# Patient Record
Sex: MALE | Race: BLACK OR AFRICAN AMERICAN | Employment: OTHER | URBAN - METROPOLITAN AREA
[De-identification: names, ages, dates, MRNs, and addresses within clinical notes are randomized per-mention and may not be internally consistent; named-entity substitution may affect disease eponyms.]

---

## 2017-05-10 ENCOUNTER — OFFICE VISIT (OUTPATIENT)
Dept: INFECTIOUS DISEASES | Age: 72
End: 2017-05-10

## 2017-05-10 VITALS
SYSTOLIC BLOOD PRESSURE: 122 MMHG | BODY MASS INDEX: 24.78 KG/M2 | DIASTOLIC BLOOD PRESSURE: 66 MMHG | WEIGHT: 163 LBS | TEMPERATURE: 98.6 F

## 2017-05-10 DIAGNOSIS — I10 ESSENTIAL HYPERTENSION: ICD-10-CM

## 2017-05-10 DIAGNOSIS — Z23 NEED FOR PNEUMOCOCCAL VACCINATION: ICD-10-CM

## 2017-05-10 DIAGNOSIS — B20 HIV (HUMAN IMMUNODEFICIENCY VIRUS INFECTION) (HCC): ICD-10-CM

## 2017-05-10 DIAGNOSIS — B20 HIV (HUMAN IMMUNODEFICIENCY VIRUS INFECTION) (HCC): Primary | ICD-10-CM

## 2017-05-10 DIAGNOSIS — F20.89 OTHER SCHIZOPHRENIA (HCC): ICD-10-CM

## 2017-05-10 LAB
A/G RATIO: 1.4 (ref 1.1–2.2)
ALBUMIN SERPL-MCNC: 4.2 G/DL (ref 3.4–5)
ALP BLD-CCNC: 65 U/L (ref 40–129)
ALT SERPL-CCNC: 10 U/L (ref 10–40)
ANION GAP SERPL CALCULATED.3IONS-SCNC: 18 MMOL/L (ref 3–16)
AST SERPL-CCNC: 12 U/L (ref 15–37)
BASOPHILS ABSOLUTE: 0.1 K/UL (ref 0–0.2)
BASOPHILS RELATIVE PERCENT: 0.9 %
BILIRUB SERPL-MCNC: 2.5 MG/DL (ref 0–1)
BUN BLDV-MCNC: 12 MG/DL (ref 7–20)
CALCIUM SERPL-MCNC: 9.4 MG/DL (ref 8.3–10.6)
CHLORIDE BLD-SCNC: 99 MMOL/L (ref 99–110)
CO2: 25 MMOL/L (ref 21–32)
CREAT SERPL-MCNC: 0.7 MG/DL (ref 0.8–1.3)
EOSINOPHILS ABSOLUTE: 0.4 K/UL (ref 0–0.6)
EOSINOPHILS RELATIVE PERCENT: 5.4 %
GFR AFRICAN AMERICAN: >60
GFR NON-AFRICAN AMERICAN: >60
GLOBULIN: 3.1 G/DL
GLUCOSE BLD-MCNC: 85 MG/DL (ref 70–99)
HCT VFR BLD CALC: 42 % (ref 40.5–52.5)
HEMOGLOBIN: 14.2 G/DL (ref 13.5–17.5)
LYMPHOCYTES ABSOLUTE: 2.5 K/UL (ref 1–5.1)
LYMPHOCYTES RELATIVE PERCENT: 33.4 %
MCH RBC QN AUTO: 33.5 PG (ref 26–34)
MCHC RBC AUTO-ENTMCNC: 33.8 G/DL (ref 31–36)
MCV RBC AUTO: 98.9 FL (ref 80–100)
MONOCYTES ABSOLUTE: 0.7 K/UL (ref 0–1.3)
MONOCYTES RELATIVE PERCENT: 8.9 %
NEUTROPHILS ABSOLUTE: 3.8 K/UL (ref 1.7–7.7)
NEUTROPHILS RELATIVE PERCENT: 51.4 %
PDW BLD-RTO: 13.6 % (ref 12.4–15.4)
PLATELET # BLD: 224 K/UL (ref 135–450)
PMV BLD AUTO: 8.8 FL (ref 5–10.5)
POTASSIUM SERPL-SCNC: 4 MMOL/L (ref 3.5–5.1)
RBC # BLD: 4.25 M/UL (ref 4.2–5.9)
SODIUM BLD-SCNC: 142 MMOL/L (ref 136–145)
TOTAL PROTEIN: 7.3 G/DL (ref 6.4–8.2)
WBC # BLD: 7.5 K/UL (ref 4–11)

## 2017-05-10 PROCEDURE — 90732 PPSV23 VACC 2 YRS+ SUBQ/IM: CPT | Performed by: INTERNAL MEDICINE

## 2017-05-10 PROCEDURE — G0009 ADMIN PNEUMOCOCCAL VACCINE: HCPCS | Performed by: INTERNAL MEDICINE

## 2017-05-10 PROCEDURE — 99215 OFFICE O/P EST HI 40 MIN: CPT | Performed by: INTERNAL MEDICINE

## 2017-05-11 LAB — RPR: NORMAL

## 2017-05-13 LAB
HIV RNA PCR INTERPRETATION: DETECTED
HIV-1 RNA BY PCR, QN: ABNORMAL CPY/ML
HIV-1 RNA BY PCR, QN: ABNORMAL LOG
QUANTIFERON (R) TB GOLD (INCUBATED): NEGATIVE
QUANTIFERON MITOGEN: 9.68 IU/ML
QUANTIFERON NIL: 0.13 IU/ML
QUANTIFERON TB AG MINUS NIL: 0.2 IU/ML (ref 0–0.34)

## 2017-05-18 ENCOUNTER — CLINICAL DOCUMENTATION (OUTPATIENT)
Dept: INFECTIOUS DISEASES | Age: 72
End: 2017-05-18

## 2018-08-06 ENCOUNTER — OFFICE VISIT (OUTPATIENT)
Dept: INFECTIOUS DISEASES | Age: 73
End: 2018-08-06

## 2018-08-06 VITALS
HEIGHT: 68 IN | BODY MASS INDEX: 26.22 KG/M2 | SYSTOLIC BLOOD PRESSURE: 112 MMHG | TEMPERATURE: 98.5 F | WEIGHT: 173 LBS | DIASTOLIC BLOOD PRESSURE: 66 MMHG

## 2018-08-06 DIAGNOSIS — B20 HIV (HUMAN IMMUNODEFICIENCY VIRUS INFECTION) (HCC): ICD-10-CM

## 2018-08-06 DIAGNOSIS — I10 ESSENTIAL HYPERTENSION: ICD-10-CM

## 2018-08-06 DIAGNOSIS — B20 HIV (HUMAN IMMUNODEFICIENCY VIRUS INFECTION) (HCC): Primary | ICD-10-CM

## 2018-08-06 DIAGNOSIS — Z23 NEED FOR TDAP VACCINATION: ICD-10-CM

## 2018-08-06 DIAGNOSIS — F20.89 OTHER SCHIZOPHRENIA (HCC): ICD-10-CM

## 2018-08-06 LAB
A/G RATIO: 1.4 (ref 1.1–2.2)
ALBUMIN SERPL-MCNC: 4.4 G/DL (ref 3.4–5)
ALP BLD-CCNC: 76 U/L (ref 40–129)
ALT SERPL-CCNC: 14 U/L (ref 10–40)
ANION GAP SERPL CALCULATED.3IONS-SCNC: 16 MMOL/L (ref 3–16)
AST SERPL-CCNC: 15 U/L (ref 15–37)
BASOPHILS ABSOLUTE: 0 K/UL (ref 0–0.2)
BASOPHILS RELATIVE PERCENT: 0.5 %
BILIRUB SERPL-MCNC: 0.6 MG/DL (ref 0–1)
BUN BLDV-MCNC: 13 MG/DL (ref 7–20)
CALCIUM SERPL-MCNC: 9.8 MG/DL (ref 8.3–10.6)
CHLORIDE BLD-SCNC: 105 MMOL/L (ref 99–110)
CO2: 26 MMOL/L (ref 21–32)
CREAT SERPL-MCNC: 0.8 MG/DL (ref 0.8–1.3)
EOSINOPHILS ABSOLUTE: 0.4 K/UL (ref 0–0.6)
EOSINOPHILS RELATIVE PERCENT: 4.5 %
GFR AFRICAN AMERICAN: >60
GFR NON-AFRICAN AMERICAN: >60
GLOBULIN: 3.2 G/DL
GLUCOSE BLD-MCNC: 91 MG/DL (ref 70–99)
HCT VFR BLD CALC: 44.7 % (ref 40.5–52.5)
HEMOGLOBIN: 15.3 G/DL (ref 13.5–17.5)
LYMPHOCYTES ABSOLUTE: 2.5 K/UL (ref 1–5.1)
LYMPHOCYTES RELATIVE PERCENT: 27.8 %
MCH RBC QN AUTO: 34 PG (ref 26–34)
MCHC RBC AUTO-ENTMCNC: 34.1 G/DL (ref 31–36)
MCV RBC AUTO: 99.5 FL (ref 80–100)
MONOCYTES ABSOLUTE: 0.8 K/UL (ref 0–1.3)
MONOCYTES RELATIVE PERCENT: 9.3 %
NEUTROPHILS ABSOLUTE: 5.2 K/UL (ref 1.7–7.7)
NEUTROPHILS RELATIVE PERCENT: 57.9 %
PDW BLD-RTO: 12.9 % (ref 12.4–15.4)
PLATELET # BLD: 268 K/UL (ref 135–450)
PMV BLD AUTO: 8.1 FL (ref 5–10.5)
POTASSIUM SERPL-SCNC: 4.2 MMOL/L (ref 3.5–5.1)
RBC # BLD: 4.49 M/UL (ref 4.2–5.9)
SODIUM BLD-SCNC: 147 MMOL/L (ref 136–145)
TOTAL PROTEIN: 7.6 G/DL (ref 6.4–8.2)
WBC # BLD: 8.9 K/UL (ref 4–11)

## 2018-08-06 PROCEDURE — 99215 OFFICE O/P EST HI 40 MIN: CPT | Performed by: INTERNAL MEDICINE

## 2018-08-06 PROCEDURE — 90715 TDAP VACCINE 7 YRS/> IM: CPT | Performed by: INTERNAL MEDICINE

## 2018-08-06 PROCEDURE — 90471 IMMUNIZATION ADMIN: CPT | Performed by: INTERNAL MEDICINE

## 2018-08-06 NOTE — PROGRESS NOTES
Infectious Diseases HIV Outpatient Note      HIV history:   Test + July 1998, prior care at [facility] - Dr Geraldo Hurley, prior meds - combovir + atazavir / r ,changed to epzicom + evotaz on 7/2015. Primary Care Physician:  Atiya Pagan MD  Initial visit    6/15/15  History Obtained From:   Patient    CHIEF COMPLAINT:    Chief Complaint   Patient presents with    Follow-up     HIV       HISTORY OF PRESENT ILLNESS / Interval history:      Seen 6/15/15 - initial visit, see HIV hx, PMH, ROS, PE sections. Seen 7/20/15, 11/18/15. Pt had been at [facility] x 6 weeks, discharge on 4/15 to [place] [address]. Seen 5/4/16:  Taking and tolerating medications (Epzicom and Evotaz). Pt reports he is feeling good today. No complaints. Seen 11/9/16:    Pt last seen in May. Has had multiple psychiatric admission since last visit. Now living at [place] [address]. He is not taking HIV or other medication. He does not trust medication. He wants to leave NH. Now, he has a guardian. Seen 5/10/17:  Pt still living at [place] [address]. He IS taking HIV meds (?dolutegravir / descovy - pt did not come with a med list). He has no complaint today    Today 8/6/18:  Pt still living at [place] [address].   He is taking and tolerating taking HIV meds (abacavir / lamuvidine / Shyanne Fergusson per [facility])  He has no complaint today       Past Medical History:   Diagnosis Date    Depression     Diabetes mellitus ([address]     Erectile dysfunction     High triglycerides     HIV (human immunodeficiency virus infection) ([address]     Hyperlipidemia     Hypertension     Other specified personal history presenting hazards to health(V15.89)     5/08 1/09 psychiatric hospitalizations    Schizophrenia ([address]     Seizures ([address]     Vitamin D deficiency    vit D deficiency      Past Surgical History:   Procedure Laterality Date    HEMORRHOID SURGERY         Current Medications:    Current Outpatient sensory change or other neurologic symptom  No lymph node swelling or tenderness. No symptoms endocrinopathy. No symptoms hematologic disease. PHYSICAL EXAM:    Vitals:    /66   Temp 98.5 °F (36.9 °C) (Oral)   Ht 5' 8\" (1.727 m)   Wt 173 lb (78.5 kg)   BMI 26.30 kg/m²     GENERAL: No apparent distress. HEENT: Membranes moist, no conjunctival changes, no oral lesion - dentures  NECK:  Supple, no masses  LYMPH: No adenopathy   LUNGS: Clear b/l, no rales, no dullness  CARDIAC: RRR, no murmur appreciated  ABD:  + BS, soft / NT, no masses  :  No inguinal adenopathy, testis descended without swelling nor masses, epididymis normal, penis without lesions nor ulceration, meatus normal; no external anal lesions nor ulcers. EXT:  No rash, no edema, no lesions  NEURO: No focal neurologic findings  PSYCH: Orientation, sensorium, mood normal    DATA:    See EPIC -   6/15/15 CD4 746 (27%), VL <20  11/18/15 CD4 782 (26) , VL <20  5/4/16   CD4 618, VL <20  11/9/16 CD4 424, VL 41,000 [off medications]    IMPRESSION    # HIV: since 1998, prior care at [place] [address]. On atazanavir / r / combovir; prefer to get pt off combovir (twice a day, tenofovir not optimal for older pt with HTN, will changed to epzicom with abacavir and lamuvidine (once day too)) in 7/2015. # HTN:   # Psych: schizophrenia, depression; sees Dr Kathi Saavedra [facility]); on quetiapine (seroquel); recent hospitalization at [facility] [place].   Past psychiatrist - Dr Graham Nagy  # Gout: on allopurinol    HIV Health Maintenance:  HIV:  Diagnosis / RF 3/1998 / MSM   HIV genotype     HLA   6/15/15 negative    CD4    5/10/17 - 602 (22%)   HIV VL   5/10/17 <20   Therapy  8/6/18 - start doltegravir / descovy; prior evotaz / abacavir / lamuvidine    Vaccines:   Pneumovax   5/10/17    Prevnar  11/18/15   Meningococcal vax     Flu vax   11/18/15   HAV vax  11/18/15,5/4/16; 11/9/16 HAV ab +   HBV vax   6/15/15 HB Sab +; immune    Tdap

## 2018-08-09 LAB
HIV RNA PCR INTERPRETATION: DETECTED
HIV-1 RNA BY PCR, QN: 1.3 LOG
HIV-1 RNA BY PCR, QN: 22 CPY/ML

## 2018-08-10 LAB — MISCELLANEOUS LAB TEST ORDER: NORMAL

## 2019-01-07 ENCOUNTER — OFFICE VISIT (OUTPATIENT)
Dept: INFECTIOUS DISEASES | Age: 74
End: 2019-01-07
Payer: COMMERCIAL

## 2019-01-07 VITALS
SYSTOLIC BLOOD PRESSURE: 108 MMHG | BODY MASS INDEX: 25.54 KG/M2 | TEMPERATURE: 98 F | WEIGHT: 168 LBS | DIASTOLIC BLOOD PRESSURE: 60 MMHG

## 2019-01-07 DIAGNOSIS — B20 HIV (HUMAN IMMUNODEFICIENCY VIRUS INFECTION) (HCC): ICD-10-CM

## 2019-01-07 DIAGNOSIS — F20.89 OTHER SCHIZOPHRENIA (HCC): ICD-10-CM

## 2019-01-07 DIAGNOSIS — B20 HIV (HUMAN IMMUNODEFICIENCY VIRUS INFECTION) (HCC): Primary | ICD-10-CM

## 2019-01-07 DIAGNOSIS — Z23 NEED FOR MENINGOCOCCUS VACCINE: Primary | ICD-10-CM

## 2019-01-07 DIAGNOSIS — I10 ESSENTIAL HYPERTENSION: ICD-10-CM

## 2019-01-07 LAB
A/G RATIO: 1.4 (ref 1.1–2.2)
ALBUMIN SERPL-MCNC: 4.5 G/DL (ref 3.4–5)
ALP BLD-CCNC: 71 U/L (ref 40–129)
ALT SERPL-CCNC: 14 U/L (ref 10–40)
ANION GAP SERPL CALCULATED.3IONS-SCNC: 19 MMOL/L (ref 3–16)
AST SERPL-CCNC: 17 U/L (ref 15–37)
BASOPHILS ABSOLUTE: 0 K/UL (ref 0–0.2)
BASOPHILS RELATIVE PERCENT: 0.3 %
BILIRUB SERPL-MCNC: 0.6 MG/DL (ref 0–1)
BUN BLDV-MCNC: 15 MG/DL (ref 7–20)
CALCIUM SERPL-MCNC: 9.8 MG/DL (ref 8.3–10.6)
CHLORIDE BLD-SCNC: 102 MMOL/L (ref 99–110)
CO2: 23 MMOL/L (ref 21–32)
CREAT SERPL-MCNC: 1 MG/DL (ref 0.8–1.3)
EOSINOPHILS ABSOLUTE: 0.3 K/UL (ref 0–0.6)
EOSINOPHILS RELATIVE PERCENT: 2.6 %
GFR AFRICAN AMERICAN: >60
GFR NON-AFRICAN AMERICAN: >60
GLOBULIN: 3.3 G/DL
GLUCOSE BLD-MCNC: 79 MG/DL (ref 70–99)
HCT VFR BLD CALC: 47.9 % (ref 40.5–52.5)
HEMOGLOBIN: 16.1 G/DL (ref 13.5–17.5)
LYMPHOCYTES ABSOLUTE: 2.3 K/UL (ref 1–5.1)
LYMPHOCYTES RELATIVE PERCENT: 22.8 %
MCH RBC QN AUTO: 33.1 PG (ref 26–34)
MCHC RBC AUTO-ENTMCNC: 33.5 G/DL (ref 31–36)
MCV RBC AUTO: 98.7 FL (ref 80–100)
MONOCYTES ABSOLUTE: 0.9 K/UL (ref 0–1.3)
MONOCYTES RELATIVE PERCENT: 8.7 %
NEUTROPHILS ABSOLUTE: 6.6 K/UL (ref 1.7–7.7)
NEUTROPHILS RELATIVE PERCENT: 65.6 %
PDW BLD-RTO: 12.8 % (ref 12.4–15.4)
PLATELET # BLD: 256 K/UL (ref 135–450)
PMV BLD AUTO: 8.4 FL (ref 5–10.5)
POTASSIUM SERPL-SCNC: 4 MMOL/L (ref 3.5–5.1)
RBC # BLD: 4.85 M/UL (ref 4.2–5.9)
SODIUM BLD-SCNC: 144 MMOL/L (ref 136–145)
TOTAL PROTEIN: 7.8 G/DL (ref 6.4–8.2)
WBC # BLD: 10 K/UL (ref 4–11)

## 2019-01-07 PROCEDURE — 90471 IMMUNIZATION ADMIN: CPT | Performed by: INTERNAL MEDICINE

## 2019-01-07 PROCEDURE — 99215 OFFICE O/P EST HI 40 MIN: CPT | Performed by: INTERNAL MEDICINE

## 2019-01-07 PROCEDURE — 90734 MENACWYD/MENACWYCRM VACC IM: CPT | Performed by: INTERNAL MEDICINE

## 2019-01-10 LAB
HIV-1 QNT LOG, IU/ML: <1.47 LOG CPY/ML
HIV-1 QNT, IU/ML: ABNORMAL CPY/ML
INTERPRETATION: DETECTED

## 2019-10-01 RX ORDER — CETIRIZINE HYDROCHLORIDE 10 MG/1
10 TABLET ORAL DAILY
COMMUNITY

## 2019-10-01 RX ORDER — LACOSAMIDE 50 MG/1
50 TABLET ORAL 2 TIMES DAILY
COMMUNITY

## 2019-10-01 RX ORDER — HYDROCHLOROTHIAZIDE 12.5 MG/1
12.5 CAPSULE, GELATIN COATED ORAL DAILY
COMMUNITY

## 2019-10-01 RX ORDER — FLUPHENAZINE HYDROCHLORIDE 10 MG/1
10 TABLET ORAL DAILY
COMMUNITY

## 2019-10-01 RX ORDER — ERYTHROMYCIN 5 MG/G
OINTMENT OPHTHALMIC NIGHTLY
COMMUNITY

## 2019-10-04 ENCOUNTER — ANESTHESIA EVENT (OUTPATIENT)
Dept: SURGERY | Age: 74
End: 2019-10-04
Payer: MEDICARE

## 2019-10-07 ENCOUNTER — ANESTHESIA (OUTPATIENT)
Dept: SURGERY | Age: 74
End: 2019-10-07
Payer: MEDICARE

## 2019-10-07 ENCOUNTER — HOSPITAL ENCOUNTER (OUTPATIENT)
Age: 74
Setting detail: OUTPATIENT SURGERY
Discharge: HOME | End: 2019-10-07
Attending: OPHTHALMOLOGY | Admitting: OPHTHALMOLOGY
Payer: MEDICARE

## 2019-10-07 VITALS
TEMPERATURE: 97.9 F | DIASTOLIC BLOOD PRESSURE: 72 MMHG | HEART RATE: 73 BPM | SYSTOLIC BLOOD PRESSURE: 116 MMHG | BODY MASS INDEX: 26.07 KG/M2 | OXYGEN SATURATION: 100 % | WEIGHT: 172 LBS | HEIGHT: 68 IN | RESPIRATION RATE: 15 BRPM

## 2019-10-07 VITALS — OXYGEN SATURATION: 100 % | DIASTOLIC BLOOD PRESSURE: 72 MMHG | SYSTOLIC BLOOD PRESSURE: 101 MMHG

## 2019-10-07 PROBLEM — H02.036: Status: ACTIVE | Noted: 2019-10-07

## 2019-10-07 PROBLEM — H02.036: Status: RESOLVED | Noted: 2019-10-07 | Resolved: 2019-10-07

## 2019-10-07 PROCEDURE — 6370000000 HC RX 637 (ALT 250 FOR IP): Performed by: OPHTHALMOLOGY

## 2019-10-07 PROCEDURE — 2709999900 HC NON-CHARGEABLE SUPPLY: Performed by: OPHTHALMOLOGY

## 2019-10-07 PROCEDURE — 3700000000 HC ANESTHESIA ATTENDED CARE: Performed by: OPHTHALMOLOGY

## 2019-10-07 PROCEDURE — 2580000003 HC RX 258: Performed by: NURSE ANESTHETIST, CERTIFIED REGISTERED

## 2019-10-07 PROCEDURE — 2500000003 HC RX 250 WO HCPCS: Performed by: OPHTHALMOLOGY

## 2019-10-07 PROCEDURE — 3600000012 HC SURGERY LEVEL 2 ADDTL 15MIN: Performed by: OPHTHALMOLOGY

## 2019-10-07 PROCEDURE — 3700000001 HC ADD 15 MINUTES (ANESTHESIA): Performed by: OPHTHALMOLOGY

## 2019-10-07 PROCEDURE — 7100000010 HC PHASE II RECOVERY - FIRST 15 MIN: Performed by: OPHTHALMOLOGY

## 2019-10-07 PROCEDURE — 7100000011 HC PHASE II RECOVERY - ADDTL 15 MIN: Performed by: OPHTHALMOLOGY

## 2019-10-07 PROCEDURE — 3600000002 HC SURGERY LEVEL 2 BASE: Performed by: OPHTHALMOLOGY

## 2019-10-07 PROCEDURE — 6360000002 HC RX W HCPCS: Performed by: NURSE ANESTHETIST, CERTIFIED REGISTERED

## 2019-10-07 RX ORDER — SODIUM CHLORIDE 9 MG/ML
INJECTION, SOLUTION INTRAVENOUS CONTINUOUS
Status: DISCONTINUED | OUTPATIENT
Start: 2019-10-07 | End: 2019-10-07 | Stop reason: HOSPADM

## 2019-10-07 RX ORDER — PROPOFOL 10 MG/ML
INJECTION, EMULSION INTRAVENOUS PRN
Status: DISCONTINUED | OUTPATIENT
Start: 2019-10-07 | End: 2019-10-07 | Stop reason: SDUPTHER

## 2019-10-07 RX ORDER — ERYTHROMYCIN 5 MG/G
OINTMENT OPHTHALMIC
Status: COMPLETED | OUTPATIENT
Start: 2019-10-07 | End: 2019-10-07

## 2019-10-07 RX ORDER — TETRACAINE HYDROCHLORIDE 5 MG/ML
SOLUTION OPHTHALMIC
Status: COMPLETED | OUTPATIENT
Start: 2019-10-07 | End: 2019-10-07

## 2019-10-07 RX ORDER — MIDAZOLAM HYDROCHLORIDE 1 MG/ML
INJECTION INTRAMUSCULAR; INTRAVENOUS PRN
Status: DISCONTINUED | OUTPATIENT
Start: 2019-10-07 | End: 2019-10-07 | Stop reason: SDUPTHER

## 2019-10-07 RX ORDER — ONDANSETRON 2 MG/ML
4 INJECTION INTRAMUSCULAR; INTRAVENOUS
Status: DISCONTINUED | OUTPATIENT
Start: 2019-10-07 | End: 2019-10-07 | Stop reason: HOSPADM

## 2019-10-07 RX ORDER — SODIUM CHLORIDE 0.9 % (FLUSH) 0.9 %
10 SYRINGE (ML) INJECTION PRN
Status: DISCONTINUED | OUTPATIENT
Start: 2019-10-07 | End: 2019-10-07 | Stop reason: HOSPADM

## 2019-10-07 RX ORDER — SODIUM CHLORIDE 9 MG/ML
INJECTION, SOLUTION INTRAVENOUS CONTINUOUS PRN
Status: DISCONTINUED | OUTPATIENT
Start: 2019-10-07 | End: 2019-10-07 | Stop reason: SDUPTHER

## 2019-10-07 RX ORDER — SODIUM CHLORIDE 0.9 % (FLUSH) 0.9 %
10 SYRINGE (ML) INJECTION EVERY 12 HOURS SCHEDULED
Status: DISCONTINUED | OUTPATIENT
Start: 2019-10-07 | End: 2019-10-07 | Stop reason: HOSPADM

## 2019-10-07 RX ADMIN — PROPOFOL 70 MG: 10 INJECTION, EMULSION INTRAVENOUS at 10:40

## 2019-10-07 RX ADMIN — SODIUM CHLORIDE: 9 INJECTION, SOLUTION INTRAVENOUS at 10:27

## 2019-10-07 RX ADMIN — MIDAZOLAM 1 MG: 1 INJECTION INTRAMUSCULAR; INTRAVENOUS at 10:40

## 2019-10-07 ASSESSMENT — PULMONARY FUNCTION TESTS
PIF_VALUE: 0

## 2019-10-07 ASSESSMENT — PAIN SCALES - GENERAL
PAINLEVEL_OUTOF10: 0
PAINLEVEL_OUTOF10: 0

## 2019-10-07 ASSESSMENT — PAIN - FUNCTIONAL ASSESSMENT: PAIN_FUNCTIONAL_ASSESSMENT: 0-10

## 2019-10-07 ASSESSMENT — LIFESTYLE VARIABLES: SMOKING_STATUS: 1

## 2019-10-07 ASSESSMENT — ENCOUNTER SYMPTOMS: SHORTNESS OF BREATH: 0

## 2022-12-29 ENCOUNTER — HOSPITAL ENCOUNTER (EMERGENCY)
Age: 77
Discharge: OTHER INSTITUTION | End: 2022-12-29
Attending: EMERGENCY MEDICINE
Payer: MEDICARE

## 2022-12-29 VITALS
RESPIRATION RATE: 16 BRPM | SYSTOLIC BLOOD PRESSURE: 135 MMHG | DIASTOLIC BLOOD PRESSURE: 84 MMHG | HEART RATE: 80 BPM | TEMPERATURE: 97.9 F | OXYGEN SATURATION: 97 %

## 2022-12-29 DIAGNOSIS — Z13.9 ENCOUNTER FOR MEDICAL SCREENING EXAMINATION: Primary | ICD-10-CM

## 2022-12-29 LAB
ALBUMIN SERPL-MCNC: 3.9 G/DL (ref 3.4–5)
ALP BLD-CCNC: 43 U/L (ref 40–129)
ALT SERPL-CCNC: 7 U/L (ref 10–40)
ANION GAP SERPL CALCULATED.3IONS-SCNC: 10 MMOL/L (ref 3–16)
AST SERPL-CCNC: 17 U/L (ref 15–37)
BASOPHILS ABSOLUTE: 0 K/UL (ref 0–0.2)
BASOPHILS RELATIVE PERCENT: 0.7 %
BILIRUB SERPL-MCNC: 0.4 MG/DL (ref 0–1)
BILIRUBIN DIRECT: <0.2 MG/DL (ref 0–0.3)
BILIRUBIN URINE: NEGATIVE
BILIRUBIN, INDIRECT: ABNORMAL MG/DL (ref 0–1)
BLOOD, URINE: ABNORMAL
BUN BLDV-MCNC: 11 MG/DL (ref 7–20)
CALCIUM SERPL-MCNC: 9.2 MG/DL (ref 8.3–10.6)
CHLORIDE BLD-SCNC: 104 MMOL/L (ref 99–110)
CLARITY: CLEAR
CO2: 26 MMOL/L (ref 21–32)
COLOR: YELLOW
CREAT SERPL-MCNC: 0.9 MG/DL (ref 0.8–1.3)
EOSINOPHILS ABSOLUTE: 0.2 K/UL (ref 0–0.6)
EOSINOPHILS RELATIVE PERCENT: 3.3 %
GFR SERPL CREATININE-BSD FRML MDRD: >60 ML/MIN/{1.73_M2}
GLUCOSE BLD-MCNC: 117 MG/DL (ref 70–99)
GLUCOSE URINE: NEGATIVE MG/DL
HCT VFR BLD CALC: 40.5 % (ref 40.5–52.5)
HEMOGLOBIN: 13.9 G/DL (ref 13.5–17.5)
KETONES, URINE: NEGATIVE MG/DL
LEUKOCYTE ESTERASE, URINE: NEGATIVE
LYMPHOCYTES ABSOLUTE: 2 K/UL (ref 1–5.1)
LYMPHOCYTES RELATIVE PERCENT: 31.1 %
MCH RBC QN AUTO: 31.7 PG (ref 26–34)
MCHC RBC AUTO-ENTMCNC: 34.3 G/DL (ref 31–36)
MCV RBC AUTO: 92.7 FL (ref 80–100)
MICROSCOPIC EXAMINATION: YES
MONOCYTES ABSOLUTE: 0.5 K/UL (ref 0–1.3)
MONOCYTES RELATIVE PERCENT: 7.7 %
MUCUS: ABNORMAL /LPF
NEUTROPHILS ABSOLUTE: 3.6 K/UL (ref 1.7–7.7)
NEUTROPHILS RELATIVE PERCENT: 57.2 %
NITRITE, URINE: NEGATIVE
PDW BLD-RTO: 13.2 % (ref 12.4–15.4)
PH UA: 6 (ref 5–8)
PLATELET # BLD: 216 K/UL (ref 135–450)
PMV BLD AUTO: 7.6 FL (ref 5–10.5)
POTASSIUM REFLEX MAGNESIUM: 4.1 MMOL/L (ref 3.5–5.1)
PROTEIN UA: 30 MG/DL
RBC # BLD: 4.37 M/UL (ref 4.2–5.9)
RBC UA: ABNORMAL /HPF (ref 0–4)
SODIUM BLD-SCNC: 140 MMOL/L (ref 136–145)
SPECIFIC GRAVITY UA: 1.02 (ref 1–1.03)
TOTAL PROTEIN: 7.5 G/DL (ref 6.4–8.2)
URINE REFLEX TO CULTURE: ABNORMAL
URINE TYPE: ABNORMAL
UROBILINOGEN, URINE: 0.2 E.U./DL
WBC # BLD: 6.3 K/UL (ref 4–11)
WBC UA: ABNORMAL /HPF (ref 0–5)

## 2022-12-29 PROCEDURE — 80048 BASIC METABOLIC PNL TOTAL CA: CPT

## 2022-12-29 PROCEDURE — 99283 EMERGENCY DEPT VISIT LOW MDM: CPT

## 2022-12-29 PROCEDURE — 85025 COMPLETE CBC W/AUTO DIFF WBC: CPT

## 2022-12-29 PROCEDURE — 80076 HEPATIC FUNCTION PANEL: CPT

## 2022-12-29 PROCEDURE — 81001 URINALYSIS AUTO W/SCOPE: CPT

## 2022-12-29 PROCEDURE — 36415 COLL VENOUS BLD VENIPUNCTURE: CPT

## 2022-12-29 ASSESSMENT — ENCOUNTER SYMPTOMS
SHORTNESS OF BREATH: 0
BACK PAIN: 0
CHEST TIGHTNESS: 0
ABDOMINAL PAIN: 0
NAUSEA: 0
VOMITING: 0

## 2022-12-29 ASSESSMENT — PAIN - FUNCTIONAL ASSESSMENT: PAIN_FUNCTIONAL_ASSESSMENT: NONE - DENIES PAIN

## 2022-12-29 NOTE — ED PROVIDER NOTES
[address] ENCOUNTER          PHYSICIAN ASSISTANT NOTE       Date of evaluation: 12/29/2022    Chief Complaint     Other (Sent from [facility] for medical clearance. Pt has no complaints/ )    History of Present Illness     Jamel Estrada is a 78 y.o. male with a past medical history of bipolar disorder, dementia, hypertension, seizures, HIV last CD4 count 481 (12/15/2021) resenting to the emergency department for medical clearance before he can be accepted to [facility]. He was previously living in a nursing home, and transition initiated to [facility] today. According to social work he needs basic blood work and urinalysis before he can be accepted. Patient has no acute complaints, denies chest pain, shortness of breath, abdominal pain, nausea or vomiting. No recent fevers or chills. No recent falls, trauma or injury. Review of Systems     Review of Systems   Constitutional:  Negative for chills, diaphoresis, fatigue and fever. Respiratory:  Negative for chest tightness and shortness of breath. Cardiovascular:  Negative for chest pain, palpitations and leg swelling. Gastrointestinal:  Negative for abdominal pain, nausea and vomiting. Genitourinary:  Negative for dysuria and frequency. Musculoskeletal:  Negative for arthralgias, back pain and myalgias. Neurological:  Negative for dizziness, weakness and light-headedness. Past Medical, Surgical, Family, and Social History     He has a past medical history of Depression, Diabetes mellitus ([address] Erectile dysfunction, High triglycerides, HIV (human immunodeficiency virus infection) ([address] Hyperlipidemia, Hypertension, Idiopathic gout, Intermittent confusion, Muscle weakness, Osteoarthritis, Other specified personal history presenting hazards to health(V15.89), Schizophrenia ([address] Seizures ([address] and Vitamin D deficiency.   He has a past surgical history that includes Hemorrhoid surgery and Eye surgery (Right, 10/7/2019). His family history is not on file. He reports that he has been smoking. He uses smokeless tobacco. He reports that he does not drink alcohol and does not use drugs. Medications     Previous Medications    ALLOPURINOL (ZYLOPRIM) 100 MG TABLET    Take 2 tablets twice a day    ATORVASTATIN (LIPITOR) 40 MG TABLET    Take 1 tablet by mouth daily    CETIRIZINE (ZYRTEC) 10 MG TABLET    Take 10 mg by mouth daily    CHOLECALCIFEROL (VITAMIN D) 1000 UNIT CAPS    Take 50,000 Int'l Units by mouth     DOLUTEGRAVIR SODIUM (TIVICAY) 50 MG TABLET    Take 50 mg by mouth daily    EMTRICITABINE-TENOFOVIR ALAFENAMIDE (DESCOVY) 200-25 MG TABS TABLET    Take 1 tablet by mouth daily    ERYTHROMYCIN (ROMYCIN) 5 MG/GM OPHTHALMIC OINTMENT    nightly    FLUPHENAZINE HCL (PROLIXIN) 10 MG TABLET    Take 10 mg by mouth daily    HYDROCHLOROTHIAZIDE (MICROZIDE) 12.5 MG CAPSULE    Take 12.5 mg by mouth daily    HYPROMELLOSE (ARTIFICIAL TEARS) 0.4 % SOLN OPHTHALMIC SOLUTION    Place 1 drop into both eyes every 4 hours as needed    IBUPROFEN (ADVIL;MOTRIN) 800 MG TABLET    Take 1 tablet by mouth 3 times daily as needed    LACOSAMIDE (VIMPAT) 50 MG TABS TABLET    Take 50 mg by mouth 2 times daily. Allergies     He is allergic to haloperidol, valproic acid, and ziprasidone. Physical Exam     INITIAL VITALS: BP: 135/84, Temp: 97.9 °F (36.6 °C), Heart Rate: 80, Resp: 16, SpO2: 97 %  Physical Exam  Vitals and nursing note reviewed. Constitutional:       General: He is not in acute distress. Appearance: Normal appearance. He is normal weight. He is not ill-appearing, toxic-appearing or diaphoretic. HENT:      Head: Normocephalic and atraumatic. Eyes:      Extraocular Movements: Extraocular movements intact. Pupils: Pupils are equal, round, and reactive to light. Cardiovascular:      Rate and Rhythm: Normal rate and regular rhythm. Pulses: Normal pulses. Heart sounds: Normal heart sounds.  No murmur heard.    No friction rub. No gallop. Pulmonary:      Effort: Pulmonary effort is normal. No respiratory distress. Breath sounds: Normal breath sounds. No stridor. No wheezing, rhonchi or rales. Chest:      Chest wall: No tenderness. Abdominal:      General: There is no distension. Palpations: There is no mass. Tenderness: There is no abdominal tenderness. There is no guarding. Musculoskeletal:         General: Normal range of motion. Cervical back: Normal range of motion. Right lower leg: No edema. Left lower leg: No edema. Skin:     General: Skin is warm. Neurological:      General: No focal deficit present. Mental Status: He is alert.    Psychiatric:         Mood and Affect: Mood normal.         Behavior: Behavior normal.       Diagnostic Results     RADIOLOGY:  No orders to display     LABS:   Results for orders placed or performed during the hospital encounter of 12/29/22   CBC with Auto Differential   Result Value Ref Range    WBC 6.3 4.0 - 11.0 K/uL    RBC 4.37 4.20 - 5.90 M/uL    Hemoglobin 13.9 13.5 - 17.5 g/dL    Hematocrit 40.5 40.5 - 52.5 %    MCV 92.7 80.0 - 100.0 fL    MCH 31.7 26.0 - 34.0 pg    MCHC 34.3 31.0 - 36.0 g/dL    RDW 13.2 12.4 - 15.4 %    Platelets 891 794 - 956 K/uL    MPV 7.6 5.0 - 10.5 fL    Neutrophils % 57.2 %    Lymphocytes % 31.1 %    Monocytes % 7.7 %    Eosinophils % 3.3 %    Basophils % 0.7 %    Neutrophils Absolute 3.6 1.7 - 7.7 K/uL    Lymphocytes Absolute 2.0 1.0 - 5.1 K/uL    Monocytes Absolute 0.5 0.0 - 1.3 K/uL    Eosinophils Absolute 0.2 0.0 - 0.6 K/uL    Basophils Absolute 0.0 0.0 - 0.2 K/uL   BMP w/ Reflex to MG   Result Value Ref Range    Sodium 140 136 - 145 mmol/L    Potassium reflex Magnesium 4.1 3.5 - 5.1 mmol/L    Chloride 104 99 - 110 mmol/L    CO2 26 21 - 32 mmol/L    Anion Gap 10 3 - 16    Glucose 117 (H) 70 - 99 mg/dL    BUN 11 7 - 20 mg/dL    Creatinine 0.9 0.8 - 1.3 mg/dL    Est, Glom Filt Rate >60 >60    Calcium 9.2 8.3 - 10.6 mg/dL   Urinalysis with Reflex to Culture    Specimen: Urine   Result Value Ref Range    Color, UA Yellow Straw/Yellow    Clarity, UA Clear Clear    Glucose, Ur Negative Negative mg/dL    Bilirubin Urine Negative Negative    Ketones, Urine Negative Negative mg/dL    Specific Gravity, UA 1.025 1.005 - 1.030    Blood, Urine TRACE-INTACT (A) Negative    pH, UA 6.0 5.0 - 8.0    Protein, UA 30 (A) Negative mg/dL    Urobilinogen, Urine 0.2 <2.0 E.U./dL    Nitrite, Urine Negative Negative    Leukocyte Esterase, Urine Negative Negative    Microscopic Examination YES     Urine Type NotGiven     Urine Reflex to Culture Not Indicated    Hepatic Function Panel   Result Value Ref Range    Total Protein 7.5 6.4 - 8.2 g/dL    Albumin 3.9 3.4 - 5.0 g/dL    Alkaline Phosphatase 43 40 - 129 U/L    ALT 7 (L) 10 - 40 U/L    AST 17 15 - 37 U/L    Total Bilirubin 0.4 0.0 - 1.0 mg/dL    Bilirubin, Direct <0.2 0.0 - 0.3 mg/dL    Bilirubin, Indirect see below 0.0 - 1.0 mg/dL   Microscopic Urinalysis   Result Value Ref Range    Mucus, UA 2+ (A) None Seen /LPF    WBC, UA 0-2 0 - 5 /HPF    RBC, UA 5-10 (A) 0 - 4 /HPF       ED BEDSIDE ULTRASOUND:  No results found. RECENT VITALS:  BP: 135/84, Temp: 97.9 °F (36.6 °C), Heart Rate: 80, Resp: 16, SpO2: 97 %     Procedures     None    ED Course     Nursing Notes, Past Medical Hx,Past Surgical Hx, Social Hx, Allergies, and Family Hx were reviewed. The patient was given the following medications:  No orders of the defined types were placed in this encounter. CONSULTS:  None    MEDICAL DECISION MAKING / ASSESSMENT / PLAN     Keira Duncan is a 78 y.o. male presenting to the emergency department for medical clearance that he can be transferred from his nursing home to [facility] for behavioral changes. Upon presentation he was well in appearance, compliant with exam, with stable vitals.   Sick blood work completed including CBC, BMP and urinalysis, all of which were reassuring. Nothing on exam requiring further monitoring or investigation. He will be exported to [facility] for further management. This patient was also evaluated by the attending physician. All care plans were discussed and agreed upon. Clinical Impression     1. Encounter for medical screening examination      Disposition     PATIENT REFERRED TO:  No follow-up provider specified.     DISCHARGE MEDICATIONS:  New Prescriptions    No medications on file       DISPOSITION Decision To Discharge 12/29/2022 12:31:18 PM        Batsheva Buck PA-C  12/29/22 3840

## 2022-12-29 NOTE — ED PROVIDER NOTES
ED Attending Attestation Note     Date of evaluation: 12/29/2022    This patient was seen by the advance practice provider. I have seen and examined the patient, agree with the workup, evaluation, management and diagnosis. The care plan has been discussed. My assessment reveals a 82-year-old male who presents to the emergency department for medical clearance before acceptance to [facility]. Patient has no acute complaints. Really the patient require screening blood work before he can be seen at his psychiatric facility. The patient does have history of bipolar disorder, dementia, hypertensions and HIV. When asked why he is here the patient states he is unsure. He denies any chest pain, abdominal pain, headache or changes in vision. On examination fine adult male, lying in stretcher. Speaking in complete sentences. He is tangential at times. He denies any homicidal or suicidal thoughts. Denies any auditory or visual hallucinations. Lungs are clear to auscultation bilaterally. Abdomen soft, nondistended. We will proceed with medical screening evaluation and plan for discharge to facility. Tiffanie Victoria MD MPH   Physician.        Home Hale MD  12/29/22 [id number]

## 2022-12-29 NOTE — CARE COORDINATION
Clinical information was faxed to [facility] [phone]. [facility] to transport now. Confirmed admission with [place] in admission [phone].     Electronically signed by HOPE Handley RN-[facility]  Case Management  [phone]

## 2023-10-06 ENCOUNTER — APPOINTMENT (OUTPATIENT)
Dept: CT IMAGING | Age: 78
DRG: 885 | End: 2023-10-06
Payer: MEDICARE

## 2023-10-06 ENCOUNTER — HOSPITAL ENCOUNTER (INPATIENT)
Age: 78
LOS: 11 days | Discharge: HOME | DRG: 885 | End: 2023-10-18
Attending: STUDENT IN AN ORGANIZED HEALTH CARE EDUCATION/TRAINING PROGRAM | Admitting: PSYCHIATRY & NEUROLOGY
Payer: MEDICARE

## 2023-10-06 DIAGNOSIS — F29 PSYCHOSIS, UNSPECIFIED PSYCHOSIS TYPE (HCC): Primary | ICD-10-CM

## 2023-10-06 LAB
ANION GAP SERPL CALCULATED.3IONS-SCNC: 11 MMOL/L (ref 3–16)
BASOPHILS # BLD: 0 K/UL (ref 0–0.2)
BASOPHILS NFR BLD: 0.7 %
BUN SERPL-MCNC: 15 MG/DL (ref 7–20)
CALCIUM SERPL-MCNC: 8.9 MG/DL (ref 8.3–10.6)
CHLORIDE SERPL-SCNC: 107 MMOL/L (ref 99–110)
CO2 SERPL-SCNC: 23 MMOL/L (ref 21–32)
CREAT SERPL-MCNC: 0.8 MG/DL (ref 0.8–1.3)
DEPRECATED RDW RBC AUTO: 13.7 % (ref 12.4–15.4)
EOSINOPHIL # BLD: 0.3 K/UL (ref 0–0.6)
EOSINOPHIL NFR BLD: 4 %
GFR SERPLBLD CREATININE-BSD FMLA CKD-EPI: >60 ML/MIN/{1.73_M2}
GLUCOSE SERPL-MCNC: 110 MG/DL (ref 70–99)
HCT VFR BLD AUTO: 42.2 % (ref 40.5–52.5)
HGB BLD-MCNC: 14.7 G/DL (ref 13.5–17.5)
LYMPHOCYTES # BLD: 2.6 K/UL (ref 1–5.1)
LYMPHOCYTES NFR BLD: 40.9 %
MCH RBC QN AUTO: 32.8 PG (ref 26–34)
MCHC RBC AUTO-ENTMCNC: 34.8 G/DL (ref 31–36)
MCV RBC AUTO: 94.2 FL (ref 80–100)
MONOCYTES # BLD: 0.8 K/UL (ref 0–1.3)
MONOCYTES NFR BLD: 11.8 %
NEUTROPHILS # BLD: 2.7 K/UL (ref 1.7–7.7)
NEUTROPHILS NFR BLD: 42.6 %
PLATELET # BLD AUTO: 196 K/UL (ref 135–450)
PMV BLD AUTO: 8 FL (ref 5–10.5)
POTASSIUM SERPL-SCNC: 5.1 MMOL/L (ref 3.5–5.1)
RBC # BLD AUTO: 4.48 M/UL (ref 4.2–5.9)
SODIUM SERPL-SCNC: 141 MMOL/L (ref 136–145)
TROPONIN, HIGH SENSITIVITY: 13 NG/L (ref 0–22)
TROPONIN, HIGH SENSITIVITY: 14 NG/L (ref 0–22)
WBC # BLD AUTO: 6.4 K/UL (ref 4–11)

## 2023-10-06 PROCEDURE — 70450 CT HEAD/BRAIN W/O DYE: CPT

## 2023-10-06 PROCEDURE — 99285 EMERGENCY DEPT VISIT HI MDM: CPT

## 2023-10-06 PROCEDURE — 84484 ASSAY OF TROPONIN QUANT: CPT

## 2023-10-06 PROCEDURE — 80048 BASIC METABOLIC PNL TOTAL CA: CPT

## 2023-10-06 PROCEDURE — 36415 COLL VENOUS BLD VENIPUNCTURE: CPT

## 2023-10-06 PROCEDURE — 93005 ELECTROCARDIOGRAM TRACING: CPT | Performed by: STUDENT IN AN ORGANIZED HEALTH CARE EDUCATION/TRAINING PROGRAM

## 2023-10-06 PROCEDURE — 85025 COMPLETE CBC W/AUTO DIFF WBC: CPT

## 2023-10-06 RX ORDER — ERYTHROMYCIN 5 MG/G
OINTMENT OPHTHALMIC EVERY 6 HOURS SCHEDULED
Status: DISCONTINUED | OUTPATIENT
Start: 2023-10-07 | End: 2023-10-18 | Stop reason: HOSPADM

## 2023-10-06 ASSESSMENT — PAIN - FUNCTIONAL ASSESSMENT: PAIN_FUNCTIONAL_ASSESSMENT: NONE - DENIES PAIN

## 2023-10-06 ASSESSMENT — LIFESTYLE VARIABLES
HOW MANY STANDARD DRINKS CONTAINING ALCOHOL DO YOU HAVE ON A TYPICAL DAY: PATIENT DOES NOT DRINK
HOW OFTEN DO YOU HAVE A DRINK CONTAINING ALCOHOL: NEVER

## 2023-10-07 PROBLEM — F29 PSYCHOSIS, UNSPECIFIED PSYCHOSIS TYPE (HCC): Status: ACTIVE | Noted: 2023-10-07

## 2023-10-07 PROBLEM — F20.0 PARANOID SCHIZOPHRENIA (HCC): Status: ACTIVE | Noted: 2023-10-07

## 2023-10-07 LAB
AMPHETAMINES UR QL SCN>1000 NG/ML: NORMAL
BARBITURATES UR QL SCN>200 NG/ML: NORMAL
BENZODIAZ UR QL SCN>200 NG/ML: NORMAL
BILIRUB UR QL STRIP.AUTO: NEGATIVE
CANNABINOIDS UR QL SCN>50 NG/ML: NORMAL
CLARITY UR: CLEAR
COCAINE UR QL SCN: NORMAL
COLOR UR: YELLOW
DRUG SCREEN COMMENT UR-IMP: NORMAL
EKG ATRIAL RATE: 78 BPM
EKG DIAGNOSIS: NORMAL
EKG P AXIS: 68 DEGREES
EKG P-R INTERVAL: 150 MS
EKG Q-T INTERVAL: 394 MS
EKG QRS DURATION: 82 MS
EKG QTC CALCULATION (BAZETT): 449 MS
EKG R AXIS: 0 DEGREES
EKG T AXIS: 139 DEGREES
EKG VENTRICULAR RATE: 78 BPM
FENTANYL SCREEN, URINE: NORMAL
GLUCOSE UR STRIP.AUTO-MCNC: NEGATIVE MG/DL
HGB UR QL STRIP.AUTO: NEGATIVE
KETONES UR STRIP.AUTO-MCNC: NEGATIVE MG/DL
LEUKOCYTE ESTERASE UR QL STRIP.AUTO: NEGATIVE
METHADONE UR QL SCN>300 NG/ML: NORMAL
NITRITE UR QL STRIP.AUTO: NEGATIVE
OPIATES UR QL SCN>300 NG/ML: NORMAL
OXYCODONE UR QL SCN: NORMAL
PCP UR QL SCN>25 NG/ML: NORMAL
PH UR STRIP.AUTO: 6 [PH] (ref 5–8)
PH UR STRIP: 5 [PH]
PROT UR STRIP.AUTO-MCNC: NEGATIVE MG/DL
SP GR UR STRIP.AUTO: 1.02 (ref 1–1.03)
UA COMPLETE W REFLEX CULTURE PNL UR: NORMAL
UA DIPSTICK W REFLEX MICRO PNL UR: NORMAL
URN SPEC COLLECT METH UR: NORMAL
UROBILINOGEN UR STRIP-ACNC: 0.2 E.U./DL

## 2023-10-07 PROCEDURE — 90792 PSYCH DIAG EVAL W/MED SRVCS: CPT | Performed by: NURSE PRACTITIONER

## 2023-10-07 PROCEDURE — 93010 ELECTROCARDIOGRAM REPORT: CPT | Performed by: INTERNAL MEDICINE

## 2023-10-07 PROCEDURE — 1240000000 HC EMOTIONAL WELLNESS R&B

## 2023-10-07 PROCEDURE — 80307 DRUG TEST PRSMV CHEM ANLYZR: CPT

## 2023-10-07 PROCEDURE — 99223 1ST HOSP IP/OBS HIGH 75: CPT | Performed by: PSYCHIATRY & NEUROLOGY

## 2023-10-07 PROCEDURE — 6370000000 HC RX 637 (ALT 250 FOR IP): Performed by: STUDENT IN AN ORGANIZED HEALTH CARE EDUCATION/TRAINING PROGRAM

## 2023-10-07 PROCEDURE — 81003 URINALYSIS AUTO W/O SCOPE: CPT

## 2023-10-07 RX ORDER — IBUPROFEN 800 MG/1
800 TABLET ORAL 3 TIMES DAILY PRN
Status: DISCONTINUED | OUTPATIENT
Start: 2023-10-07 | End: 2023-10-18 | Stop reason: HOSPADM

## 2023-10-07 RX ORDER — ACETAMINOPHEN 325 MG/1
650 TABLET ORAL EVERY 6 HOURS PRN
Status: DISCONTINUED | OUTPATIENT
Start: 2023-10-07 | End: 2023-10-18 | Stop reason: HOSPADM

## 2023-10-07 RX ORDER — EMTRICITABINE AND TENOFOVIR DISOPROXIL FUMARATE 200; 300 MG/1; MG/1
1 TABLET, FILM COATED ORAL DAILY
Status: DISCONTINUED | OUTPATIENT
Start: 2023-10-07 | End: 2023-10-18

## 2023-10-07 RX ORDER — CHOLECALCIFEROL (VITAMIN D3) 125 MCG
500 CAPSULE ORAL DAILY
COMMUNITY

## 2023-10-07 RX ORDER — LORAZEPAM 1 MG/1
1 TABLET ORAL 2 TIMES DAILY PRN
Status: DISCONTINUED | OUTPATIENT
Start: 2023-10-07 | End: 2023-10-18 | Stop reason: HOSPADM

## 2023-10-07 RX ORDER — OLANZAPINE 5 MG/1
5 TABLET ORAL 2 TIMES DAILY PRN
Status: DISCONTINUED | OUTPATIENT
Start: 2023-10-07 | End: 2023-10-18 | Stop reason: HOSPADM

## 2023-10-07 RX ORDER — CHOLECALCIFEROL (VITAMIN D3) 125 MCG
500 CAPSULE ORAL DAILY
Status: DISCONTINUED | OUTPATIENT
Start: 2023-10-07 | End: 2023-10-18 | Stop reason: HOSPADM

## 2023-10-07 RX ORDER — EMTRICITABINE AND TENOFOVIR DISOPROXIL FUMARATE 200; 300 MG/1; MG/1
1 TABLET, FILM COATED ORAL DAILY
COMMUNITY

## 2023-10-07 RX ORDER — CARBOXYMETHYLCELLULOSE SODIUM 10 MG/ML
1 GEL OPHTHALMIC EVERY 4 HOURS PRN
Status: DISCONTINUED | OUTPATIENT
Start: 2023-10-07 | End: 2023-10-18 | Stop reason: HOSPADM

## 2023-10-07 RX ADMIN — ERYTHROMYCIN: 5 OINTMENT OPHTHALMIC at 00:14

## 2023-10-07 ASSESSMENT — SLEEP AND FATIGUE QUESTIONNAIRES
AVERAGE NUMBER OF SLEEP HOURS: 7
DO YOU HAVE DIFFICULTY SLEEPING: NO
DO YOU USE A SLEEP AID: NO

## 2023-10-07 ASSESSMENT — LIFESTYLE VARIABLES
HOW OFTEN DO YOU HAVE A DRINK CONTAINING ALCOHOL: NEVER
HOW MANY STANDARD DRINKS CONTAINING ALCOHOL DO YOU HAVE ON A TYPICAL DAY: PATIENT DOES NOT DRINK

## 2023-10-07 NOTE — ED PROVIDER NOTES
Oct 07, 2023   0462 The patient was evaluated by telepsychiatry and they recommend admission. I agree with this disposition and this concludes his ED course. [NG]      ED Course User Index  [NG] Audrea Osgood, MD       Additional Reassessment    Is this patient to be included in the SEP-1 core measure? No Exclusion criteria - the patient is NOT to be included for SEP-1 Core Measure due to: Infection is not suspected    Medical Decision Making  Presentation for psychiatric decompensation medically cleared though with evidence of a likely conjunctivitis of the right eye initiated erythromycin ointment disposition pending psychiatric recommendations. Amount and/or Complexity of Data Reviewed  Labs: ordered. Decision-making details documented in ED Course. Radiology: ordered. Decision-making details documented in ED Course. ECG/medicine tests: ordered. Decision-making details documented in ED Course. Risk  Prescription drug management. Decision regarding hospitalization. The patient was given the followingmedications:  Orders Placed This Encounter   Medications    erythromycin (ROMYCIN) ophthalmic ointment       CONSULTS:  IP CONSULT TO TELE-PSYCH (SOCIAL WORK ONLY)      CRITICAL CARE TIME   Total Critical Care time was 0 minutes, excluding separately reportable procedures in the context of the following condition na. There was a high probability of clinically significant/life threatening deterioration in the patient's condition which required my urgent intervention. Clinical Impression     1. Psychosis, unspecified psychosis type ([address])        Disposition     PATIENT REFERRED TO:  No follow-up provider specified.     DISCHARGE MEDICATIONS:  New Prescriptions    No medications on file       DISPOSITION Decision To Admit 10/07/2023 12:47:56 AM    Leda Fitch MD (electronically signed)  Attending Emergency Physician             Audrea Osgood, MD  10/06/23 2573       Audrea Osgood, MD  10/07/23

## 2023-10-07 NOTE — H&P
Psychiatry History and Physical     Admission Date:    10/6/2023    Identification: domiciled, , and disabled 76yo with schizophrenia and multiple chronic medication conditions including HIV  who was brought from his nursing home with worsening psychotic symptoms in the setting of treatment non-adherence. SOI: patient. Focused record review. CC: \"everything is being destroyed. It can't come back. \"    HPI:   Per ED telepsych: The patient is a 70 y.o. male with a history of schizoaffective disorder and major neurocognitive disorder who was brought by ambulance from [address] home due to refusing medications and becoming aggressive. Per collateral obtained from patient's nephew he has been noncompliant with medications in the past week. Patient states that [employer], the demons, and the zombies are trying to destroy him and that [employer] has been destroyed. Patient states his nephew is \"the devil\" and \"put me in here. \" He says he is the richest man in the world and has 8 wives. He denies any problems with sleep. He declined to answer any questions about AH/VH. He denies any thoughts of harming himself. He says that he doesn't want to hurt anyone. Patient states that he doesn't need psychiatric medications because he is \"over my nervous breakdowns\"     Patient states he does not know why he is at the \[facility]\"". He reports that his nephew is his guardian. SW contacted the patient's nephew for collateral. Apparently the patient was not aggressive prior to moving to a new nursing home three months ago and commented to SW that he hopes we can Re[facility] the old Autumn Boeunice back. \"    Many delusional comments:  - He is Jl  - He owns his multi-unit apartment building.  - HIV and cancer has been cured throughout the country including in him so he doesn't need to take meds anymore. - [employer], [employer], and [employer] have all been destroyed.    - [address] was working for [employer] but

## 2023-10-07 NOTE — VIRTUAL HEALTH
CHAPINCITO spoke with gurpreet Silva, this morning. He reported the [place] Faust has been noncompliant with medications for the last week. Patient has been refusing to take them. Patient has been experiencing adjustment issues since moving into the current nursing facility three months ago. Thania Gomez stated patient did not display aggressive behavior at the previous nursing facility. Thania Gomez also told CHAPINCITO he hopes we can \"get the old Terence Lands back\". Arjun plans to call [facility] ED for a follow up within the next couple of hours regarding the MD's plan of care. He is aware patient may be admitted for further psychiatric evaluation and treatment. --Susanne Sewell LCSW on 10/7/2023 at 12:20 AM    An electronic signature was used to authenticate this note.
conducted with patient's (and/or legal guardian's) consent. Patient identification was verified, and a caregiver was present when appropriate. The patient was located at [facility] (Appt Department): [address] [facility],4Th Floor ED  [address] [place] [address]: [phone]  The provider was located at Home (City/State): [address] [place] [address],[address] to [place] performed by: EDUARDA Escobar CNP  Consult ordered by: Ritesh Mckeon MD  Reason for consult: agitation/aggression           Total time spent on this encounter: Not billed by time    --EDUARDA Escobar CNP on 33/7/8284 at 12:13 AM    An electronic signature was used to authenticate this note.

## 2023-10-07 NOTE — ED NOTES
0700 -- Report from [facility]. Per [facility], [address] [facility] to come to unit to retrieve pt. Zee charge RN called floor and was informed ED will have to bring pt upstairs. Writer called [employer] who will escort RN and pt to behavioral unit. Pt resting in bed at this time, no needs.       Cheyanne Longoria RN  10/07/23 2560
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
## 2023-10-08 PROCEDURE — 1240000000 HC EMOTIONAL WELLNESS R&B

## 2023-10-09 PROCEDURE — 6370000000 HC RX 637 (ALT 250 FOR IP): Performed by: PSYCHIATRY & NEUROLOGY

## 2023-10-09 PROCEDURE — 1240000000 HC EMOTIONAL WELLNESS R&B

## 2023-10-09 PROCEDURE — 99233 SBSQ HOSP IP/OBS HIGH 50: CPT | Performed by: PSYCHIATRY & NEUROLOGY

## 2023-10-09 PROCEDURE — 6370000000 HC RX 637 (ALT 250 FOR IP): Performed by: STUDENT IN AN ORGANIZED HEALTH CARE EDUCATION/TRAINING PROGRAM

## 2023-10-09 NOTE — GROUP NOTE
Group Therapy Note    Date: 10/9/2023    Group Start Time: 1000  Group End Time: 4164  Group Topic: Cognitive Skills    Canelo Chen        Group Therapy Note    Group members explored emotions/signs/behaviors. Discussed symptoms of anger, sadness, anxiety, happiness, and love. Explored ways to communicate feelings to support system. Attendees: 7       Notes:  Pt did not engage in 10:00 group therapy session, engaged in treatment with physical/occupation therapy.       Discipline Responsible: Psychoeducational Specialist      Signature:  Arabella Hubbard MM, MT-BC

## 2023-10-09 NOTE — BH NOTE
Pt alert, noncompliant with care or eye gtts stated \" I'm not taking anything no v/s medicines nothing I'm healthy and  I know what you are doing your trying to make me go blind just leave me alone. \" Pt right eye appears matted shut offered to get him a warm washcloth but refused.

## 2023-10-10 PROBLEM — Z72.0 TOBACCO USE: Status: ACTIVE | Noted: 2023-10-10

## 2023-10-10 PROBLEM — Z00.00 ENCOUNTER FOR GENERAL MEDICAL EXAMINATION: Status: ACTIVE | Noted: 2023-10-10

## 2023-10-10 PROCEDURE — 99233 SBSQ HOSP IP/OBS HIGH 50: CPT | Performed by: PSYCHIATRY & NEUROLOGY

## 2023-10-10 PROCEDURE — 1240000000 HC EMOTIONAL WELLNESS R&B

## 2023-10-10 PROCEDURE — 99222 1ST HOSP IP/OBS MODERATE 55: CPT

## 2023-10-10 NOTE — BH NOTE
[address] [place] [address] and obtained collateral from nurse who has worked with pt since his admission on 08/09/2023. Pt sometimes will interact with other residents but rarely leaves his room. Nurse reports the pt refuses all medication besides B-12. They have tried to crush up the medication and mix it into his drinks or food but the pt will still refuses it, states \"it is like he can smell it\". Pt states he has not taken any medication for 26 years and is \"healed\" from HIV. Pt's nephew is very involved and will try to come see or call him every week. Nurse states that pt is mean towards the nephew and will yell at him on the phone. Says if anyone brings up his nephew it will set the pt off, and he will become aggressive. According to the nurse, the situation leading up to his hospitalization was out of nowhere, and she is unsure of what happened that triggered him. Nurse said pt went \"ballistic\", pt was yelling that everyone was going to be [facility], kept going on about Obama, and people being the devil and from hell. Nurse stated this went on all day, and they were unable to redirect him.          India Sher., BSW Student Intern    Reviewed by MARIA ALEJANDRA Ramírez

## 2023-10-10 NOTE — H&P
Hospital Medicine History & Physical      PCP: Natalia Pinto MD    Date of Admission: 10/6/2023    Date of Service: Pt seen/examined on 10/10/2023     Chief Complaint:    Chief Complaint   Patient presents with    Aggressive Behavior     From [facility], called EMS due to not taking meds and becoming combative. Pt calm upon arrival.          History Of Present Illness: The patient is a 70 y.o. male with schizophrenia, depression, OA, gout, HTN, HLD, and HIV who presented to [facility] ED from [facility] with complaint of not taking medications and aggressive behavior. Patient was seen and evaluated in the ED by the ED medical provider, patient was medically cleared for admission to [facility] at [facility]. This note serves as an admission medical H&P.    PCP: Natalia Pinto MD  Tobacco use: current, smokes pipe  ETOH use: denies  Illicit drug use: denies    Patient denies any symptoms/complaints. Past Medical History:        Diagnosis Date    Depression     Diabetes mellitus ([address])     Erectile dysfunction     High triglycerides     HIV (human immunodeficiency virus infection) ([address])     Hyperlipidemia     Hypertension     Idiopathic gout     Intermittent confusion     Muscle weakness     Osteoarthritis     Other specified personal history presenting hazards to health(V15.89)     5/08 1/09 psychiatric hospitalizations    Schizophrenia ([address])     Seizures ([address])     Vitamin D deficiency        Past Surgical History:        Procedure Laterality Date    EYE SURGERY Right 10/7/2019    LOWER LID ENTROPION REPAIR performed by Dimitri Ahumada MD at [address] [place] [address]         Medications Prior to Admission:    Prior to Admission medications    Medication Sig Start Date End Date Taking?  Authorizing Provider   emtricitabine-tenofovir (TRUVADA) 200-300 MG per tablet Take 1 tablet by mouth daily morning   Yes Provider, Historical, MD   Valbenazine Tosylate 40 MG CAPS Take 1 capsule by mouth

## 2023-10-11 PROCEDURE — 1240000000 HC EMOTIONAL WELLNESS R&B

## 2023-10-11 PROCEDURE — 99233 SBSQ HOSP IP/OBS HIGH 50: CPT | Performed by: PSYCHIATRY & NEUROLOGY

## 2023-10-12 PROCEDURE — 99233 SBSQ HOSP IP/OBS HIGH 50: CPT | Performed by: PSYCHIATRY & NEUROLOGY

## 2023-10-12 PROCEDURE — 1240000000 HC EMOTIONAL WELLNESS R&B

## 2023-10-13 PROCEDURE — 99233 SBSQ HOSP IP/OBS HIGH 50: CPT | Performed by: PSYCHIATRY & NEUROLOGY

## 2023-10-13 PROCEDURE — 1240000000 HC EMOTIONAL WELLNESS R&B

## 2023-10-13 NOTE — BH NOTE
Bedside Mobility Assessment Tool (BMAT):     Assessment Level 1- Sit and Shake    1. From a semi-reclined position, ask patient to sit up and rotate to a seated position at the side of the bed. Can use the bedrail. 2. Ask patient to reach out and grab your hand and shake making sure patient reaches across his/her midline. Fail- Patient is unable to perform tasks, patient is MOBILITY LEVEL 1. Assessment Level 2- Stretch and Point   1. With patient in seated position at the side of the bed, have patient place both feet on the floor (or stool) with knees no higher than hips. 2. Ask patient to stretch one leg and straighten the knee, then bend the ankle/flex and point the toes. If appropriate, repeat with the other leg. Fail- Patient is unable to complete task. Patient is MOBILITY LEVEL 2. Assessment Level 3- Stand   1. Ask patient to elevate off the bed or chair (seated to standing) using an assistive device (cane, bedrail). 2. Patient should be able to raise buttocks off be and hold for a count of five. May repeat once. Fail- Patient unable to demonstrate standing stability. Patient is MOBILITY LEVEL 3. Assessment Level 4- Walk   1. Ask patient to march in place at bedside. 2. Then ask patient to advance step and return each foot. Some medical conditions may render a patient from stepping backwards, use your best clinical judgement. Fail- Patient not able to complete tasks OR requires use of assistive device. Patient is MOBILITY LEVEL 3. Pt refuse to complete assessment, pt does ambulate to bathroom and back to bed.     Mobility Level- 1

## 2023-10-13 NOTE — BH NOTE
Spoke with Sendy Ewing in admissions at [facility] and provided an update on pt's progress and treatment plan.

## 2023-10-13 NOTE — BH NOTE
Pt refused all care medications and  v/s. Pt would not answer any questions. Snacks and fresh water placed on over bed table. He did eat his sandwich and chips.

## 2023-10-14 PROCEDURE — 1240000000 HC EMOTIONAL WELLNESS R&B

## 2023-10-14 PROCEDURE — 99232 SBSQ HOSP IP/OBS MODERATE 35: CPT | Performed by: NURSE PRACTITIONER

## 2023-10-14 NOTE — BH NOTE
Pt a/o x 2, indp/cont,odor noted in room, pt was mildly pleasant while refusing v/s, assessments and medications, ate snack, personal items put in bathroom for his self care.

## 2023-10-15 PROCEDURE — 1240000000 HC EMOTIONAL WELLNESS R&B

## 2023-10-16 PROCEDURE — 1240000000 HC EMOTIONAL WELLNESS R&B

## 2023-10-16 NOTE — GROUP NOTE
Group Therapy Note    Date: 10/16/2023    Group Start Time: 1000  Group End Time: 9129  Group Topic: Cognitive Skills    [facility] Therapy Note    Conversation Dice    Group facilitator rolled a set of dice, each side posing a reflective question for reminiscence. Semi-structured interview process implemented throughout allowing for peer connection and increased socialization. Attendees: 4       Notes:  Mick Crews did not attend session, refusing to exit room at time of invitation.       Discipline Responsible: Psychoeducational Specialist      Signature:  Priscila Hernandez MM, MT-BC

## 2023-10-16 NOTE — GROUP NOTE
Group Therapy Note    Date: 10/16/2023    Group Start Time: 1300  Group End Time: 8539  Group Topic: Cognitive Skills    [address] [facility] [address] Therapy Note    Heads Up - Collaborative Memory Recall Game    Attendees: 3         Notes:  Pt refused group attendance at time of greeting and invitation to engage. Will follow up as appropriate and requested.     Discipline Responsible: Psychoeducational Specialist      Signature:  Scooby Harding, MM, MT-BC

## 2023-10-17 PROCEDURE — 1240000000 HC EMOTIONAL WELLNESS R&B

## 2023-10-17 NOTE — DISCHARGE INSTRUCTIONS
Nursing and Rehabilitation Has EPIC access. No fax needed. The following personal items were collected during your admission, stored in locker and/or safe, and were returned to you:    Belongings  Dental Appliances: None  Vision - Corrective Lenses: None  Hearing Aid: None  Clothing: Pants, Shirt, Undergarments  Jewelry: None  Body Piercings Removed: N/A  Electronic Devices: None  Weapons (Notify Protective Services/Security): None  Other Valuables: At home  Home Medications: None  Valuables Given To: Other (Comment)  Provide Name(s) of Who Valuable(s) Were Given To: n/a    By signing below, I understand and acknowledge receipt of the instructions indicated above.

## 2023-10-17 NOTE — BH NOTE
[address] [place] [address] and provided an update on pt's progress and treatment plan, including expected DC back to them tomorrow. They will give the information to the , and she will call back with any questions. Spoke with pt's nephew Leona Huerta and provided an update on pt's progress and treatment plan. Informed him of expected DC scheduled for tomorrow with transport ETA of 13:00.

## 2023-10-18 VITALS
OXYGEN SATURATION: 96 % | WEIGHT: 145 LBS | HEIGHT: 68 IN | HEART RATE: 73 BPM | DIASTOLIC BLOOD PRESSURE: 76 MMHG | BODY MASS INDEX: 21.98 KG/M2 | TEMPERATURE: 98.4 F | RESPIRATION RATE: 16 BRPM | SYSTOLIC BLOOD PRESSURE: 118 MMHG

## 2023-10-18 PROCEDURE — 5130000000 HC BRIDGE APPOINTMENT

## 2023-10-18 NOTE — BH NOTE
[facility]  Discharge Note    Pt discharged with followings belongings:   Dental Appliances: None  Vision - Corrective Lenses: None  Hearing Aid: None  Jewelry: None  Body Piercings Removed: N/A  Clothing: Pants, Shirt, Undergarments  Other Valuables: At home   Valuables sent home with Alexandra Villarreal or returned to patient. Patient educated on aftercare instructions: Alexandra Villarreal refused  Information faxed to no fax needed by writer  at 2:33 PM .Patient verbalize understanding of AVS:  Alexandra Villarreal refused. Status EXAM upon discharge:  Mental Status and Behavioral Exam  Normal: No  Level of Assistance: Independent/Self  Facial Expression: Flat  Affect: Congruent  Level of Consciousness: Alert  Frequency of Checks: 4 times per hour, close  Mood:Normal: No  Mood: Depressed  Motor Activity:Normal: No  Motor Activity: Decreased  Eye Contact: Fair  Observed Behavior: Withdrawn  Sexual Misconduct History: Current - no  Preception: [place] to person, [place] to place  Attention:Normal: No  Attention: Distractible  Thought Processes: Blocking  Thought Content:Normal: No  Thought Content: Paranoia  Depression Symptoms: Isolative  Anxiety Symptoms: No problems reported or observed. Jeaneth Symptoms: No problems reported or observed. Hallucinations: Unable to assess  Delusions: No  Delusions: Paranoid  Memory:Normal: No  Memory: Poor recent, Poor remote  Insight and Judgment: No  Insight and Judgment: Poor judgment    Tobacco Screening:  Practical Counseling, on admission, susu X, if applicable and completed (first 3 are required if patient doesn't refuse):             ( x) Recognizing danger situations (included triggers and roadblocks)                    (x ) Coping skills (new ways to manage stress,relaxation techniques, changing routine, distraction)                                                           ( x) Basic information about quitting (benefits of quitting, techniques in how to quit, available resources  ( ) Referral

## 2023-10-18 NOTE — PLAN OF CARE
10/15/23 @ [phone]  Pt continues to refuse all care, including vital signs and meds. Pt also refuses to engage in conversation with this nurse and won't verbally respond to most questions. Pt educated on the importance of complying with a med regimen. Pt shows no s/s of distress. Will continue to monitor. Problem: Chronic Conditions and Co-morbidities  Goal: Patient's chronic conditions and co-morbidity symptoms are monitored and maintained or improved  Outcome: Progressing     Problem: Psychosis  Goal: Will report no hallucinations or delusions  Description: INTERVENTIONS:  1. Administer medication as  ordered  2. Assist with reality testing to support increasing orientation  3. Assess if patient's hallucinations or delusions are encouraging self harm or harm to others and intervene as appropriate  10/15/2023 1433 by Marycarmen Alston RN  Outcome: Progressing  10/15/2023 0110 by Leetta Leyden, LPN  Outcome: Not Progressing     Problem: Anxiety  Goal: Will report anxiety at manageable levels  Description: INTERVENTIONS:  1. Administer medication as ordered  2. Teach and rehearse alternative coping skills  3. Provide emotional support with 1:1 interaction with staff  10/15/2023 1433 by Marycarmen Alston RN  Outcome: Progressing  10/15/2023 0110 by Leetta Leyden, LPN  Outcome: Not Progressing  Flowsheets (Taken 10/14/2023 2308)  Will report anxiety at manageable levels:   Administer medication as ordered   Teach and rehearse alternative coping skills   Provide emotional support with 1:1 interaction with staff     Problem: Self Care Deficit  Goal: Return ADL status to a safe level of function  Description: INTERVENTIONS:  1. Administer medication as ordered  2. Assess ADL deficits and provide assistive devices as needed  3. Obtain PT/OT consults as needed  4.  Assist and instruct patient to increase activity and self care as tolerated  Outcome: Progressing     Problem: Safety - Adult  Goal: Free from fall
4 Eyes Skin Assessment     The patient is being assessed for  {Blank single:97901::\"Admission\",\"Transfer to New Unit\",\"Post-Op Surgical\",\"Cath Lab Post-Op\",\"Shift Handoff\"}    I agree that 2 RN's have performed a thorough Head to Toe Skin Assessment on the patient. ALL assessment sites listed below have been assessed. Areas assessed for pressure by both nurses: no, patient refusing skin assessment  []   Head, Face, and Ears   []   Shoulders, Back, and Chest  []   Arms, Elbows, and Hands   []   Coccyx, Sacrum, and Ischum  []   Legs, Feet, and Heels                                Skin Assessed Under all Medical Devices by both nurses:  N/a               All Mepilex Borders were peeled back and area peeked at by both nurses:  No: n/a  Please list where Mepilex Borders are located:  n/a                 Does the Patient have Skin Breakdown related to pressure?   No     (Insert Photo here***)         Nicholas Prevention initiated:  No   Wound Care Orders initiated:  No      [facility] nurse consulted for Pressure Injury (Stage 3,4, Unstageable, DTI, NWPT, Complex wounds)and New or Established Ostomies:  No        Nurse 1 eSignature: Electronically signed by Leonardo Vizcaino RN on 10/7/23 at 3:49 PM EDT    **SHARE this note so that the co-signing nurse is able to place an eSignature**    Nurse 2 eSignature: {Esignature:[id number]}
[address] [place] [address] to obtain [employer]. Spoke to [employer] she stated she would fax papers.
[facility]  Day 3 Interdisciplinary Treatment Plan NOTE    Review Date & Time: 10/10/23 @ [phone]    Patient was not in treatment team    Estimated Length of Stay Update:  8-10 Days  Estimated Discharge Date Update: 10/18/23 - 10/20/23    EDUCATION:   Learner Progress Toward Treatment Goals: Reviewed goals and plan of care    Method: Individual    Outcome: Refused Education    PATIENT GOALS: None expressed    PLAN/TREATMENT RECOMMENDATIONS UPDATE: Continue treatment    GOALS UPDATE:   Time frame for Short-Term Goals: 2 days      Sandra Fenton RN
[facility]  Admission Note     Admission Type: Involuntary        Reason for admission: Psychosis, paranoia with Aggressive behaviors,          Addictive Behavior:   Addictive Behavior  In the Past 3 Months, Have You Felt or Has Someone Told You That You Have a Problem With  :  (SATYA, patient refused)    Medical Problems:   Past Medical History:   Diagnosis Date    Depression     Diabetes mellitus ([facility])     Erectile dysfunction     High triglycerides     HIV (human immunodeficiency virus infection) ([facility])     Hyperlipidemia     Hypertension     Idiopathic gout     Intermittent confusion     Muscle weakness     Osteoarthritis     Other specified personal history presenting hazards to health(V15.89)     5/08 1/09 psychiatric hospitalizations    Schizophrenia ([facility])     Seizures ([facility])     Vitamin D deficiency        Status EXAM:  Mental Status and Behavioral Exam  Normal: No  Level of Assistance: Independent/Self  Facial Expression: Hostile  Affect: Unstable  Level of Consciousness: Alert  Frequency of Checks: 4 times per hour, close  Mood:Normal: No  Mood: Suspicious, Angry  Motor Activity:Normal: Yes  Motor Activity: Decreased  Eye Contact: Good  Observed Behavior: Agitated, Guarded  Sexual Misconduct History: Current - no  Preception: [place] to person, [place] to place  Attention:Normal: Yes  Attention: Distractible, Unable to concentrate  Thought Processes: Perseveration  Thought Content:Normal: No  Thought Content: Paranoia  Depression Symptoms: Increased irritability  Anxiety Symptoms: No problems reported or observed. Jeaneth Symptoms: No problems reported or observed.   Hallucinations: Unable to assess  Delusions: Yes  Delusions: Paranoid  Memory:Normal: No  Memory: Poor recent  Insight and Judgment: No  Insight and Judgment: Poor judgment, Poor insight    Tobacco Screening:  Practical Counseling, on admission, susu X, if applicable and completed (first 3 are required if patient doesn't refuse)n/a:
Bedside Mobility Assessment Tool (BMAT):     Assessment Level 1- Sit and Shake    1. From a semi-reclined position, ask patient to sit up and rotate to a seated position at the side of the bed. Can use the bedrail. 2. Ask patient to reach out and grab your hand and shake making sure patient reaches across his/her midline. Pass- Patient is able to come to a seated position, maintain core strength. Maintains seated balance while reaching across midline. Move on to Assessment Level 2. Assessment Level 2- Stretch and Point   1. With patient in seated position at the side of the bed, have patient place both feet on the floor (or stool) with knees no higher than hips. 2. Ask patient to stretch one leg and straighten the knee, then bend the ankle/flex and point the toes. If appropriate, repeat with the other leg. Pass- Patient is able to demonstrate appropriate quad strength on intended weight bearing limb(s). Move onto Assessment Level 3. Assessment Level 3- Stand   1. Ask patient to elevate off the bed or chair (seated to standing) using an assistive device (cane, bedrail). 2. Patient should be able to raise buttocks off be and hold for a count of five. May repeat once. Pass- Patient maintains standing stability for at least 5 seconds, proceed to assessment level 4. Assessment Level 4- Walk   1. Ask patient to march in place at bedside. 2. Then ask patient to advance step and return each foot. Some medical conditions may render a patient from stepping backwards, use your best clinical judgement. Pass- Patient demonstrates balance while shifting weight and ability to step, takes independent steps, does not use assistive device patient is MOBILITY LEVEL 4.       Mobility Level- 4
Celeste Hood has been withdrawn to his room the entire shift. He refused vitals and told the staff member attempting to obtain them to \"get the hell out\". He refused all attempts of care this shift. He was not eager to converse with this writer. He is not noted to be RTIS. Respirations are easy and even. Celeste Hood did deny SI, HI, and AVH by shaking his head \"no\". Problem: Chronic Conditions and Co-morbidities  Goal: Patient's chronic conditions and co-morbidity symptoms are monitored and maintained or improved  10/9/2023 2325 by Rosibel Lo RN  Outcome: Progressing     Problem: Psychosis  Goal: Will report no hallucinations or delusions  Description: INTERVENTIONS:  1. Administer medication as  ordered  2. Assist with reality testing to support increasing orientation  3. Assess if patient's hallucinations or delusions are encouraging self harm or harm to others and intervene as appropriate  10/9/2023 2325 by Rosibel Lo RN  Outcome: Progressing     Problem: Anxiety  Goal: Will report anxiety at manageable levels  Description: INTERVENTIONS:  1. Administer medication as ordered  2. Teach and rehearse alternative coping skills  3. Provide emotional support with 1:1 interaction with staff  10/9/2023 2325 by Rosibel Lo RN  Outcome: Progressing     Problem: Involuntary Admit  Goal: Will cooperate with staff recommendations and doctor's orders and will demonstrate appropriate behavior  Description: INTERVENTIONS:  1. Treat underlying conditions and offer medication as ordered  2. Educate regarding involuntary admission procedures and rules  3. Contain excessive/inappropriate behavior per unit and hospital policies  [id number]/2/2453 [id number] by Rosibel Lo RN  Outcome: Progressing     Problem: Self Care Deficit  Goal: Return ADL status to a safe level of function  Description: INTERVENTIONS:  1. Administer medication as ordered  2. Assess ADL deficits and provide assistive devices as needed  3.  Obtain PT/OT consults as
Home Medication Reconciliation Status          [] COMPLETE       Medication history has been reviewed and obtained from the following source(s):       [] patient/family verbal report             [] patient/family provided written list       [] external pharmacy   [x] external facility list         [x]  Provider notified that home medication reconciliation is complete          [] IN PROGRESS       Medication reconciliation marked in progress at this time due to:       [] patient/family poor historian      [] waiting arrival of family to clarify       [] waiting for accurate list        [] external pharmacy needs called      * Follow up is needed. [] UNABLE TO ASSESS       Medication reconciliation is incomplete and unable to assess at this time due to:       [] critical patient condition   [] patient is unresponsive        [] no family available                       [] unknown pharmacy       [] anonymous patient          * Follow up is needed.       [] Pharmacy consult placed for medication reconciliation assistance   Additional comments:
Patient was regressed to his room & bed. Patient was pleasant during 1:1 & denied having hallucinations. Patient appeared preoccupied. Patient denied to have his vitals checked & to have writer do a physical assessment. Patient up to bathroom with walker at intervals. Patient was oriented to person & month only. Patient informed writer early in the shift, that a female patient was in his bathroom. Writer escorted the female patient out of patient's room. Patient given & ate a sandwich & chips & starry lemon lime to drink, which patient drank. Patient declined scheduled medications.  Jason Mcgarry R.N.
Problem: Anxiety  Goal: Will report anxiety at manageable levels  Description: INTERVENTIONS:  1. Administer medication as ordered  2. Teach and rehearse alternative coping skills  3. Provide emotional support with 1:1 interaction with staff  10/18/2023 0026 by Severiano Sands, LPN  Outcome: Progressing     Problem: Safety - Adult  Goal: Free from fall injury  10/18/2023 0026 by Severiano Sands, LPN  Outcome: Progressing   Patient withdrawn to room. Alert and oriented. Refusing vitals and medication administration. Patient flat effect but just wants to be left alone.
Problem: Chronic Conditions and Co-morbidities  Goal: Patient's chronic conditions and co-morbidity symptoms are monitored and maintained or improved  10/14/2023 0007 by Lonny Story LPN  Outcome: Progressing     Problem: Psychosis  Goal: Will report no hallucinations or delusions  Description: INTERVENTIONS:  1. Administer medication as  ordered  2. Assist with reality testing to support increasing orientation  3. Assess if patient's hallucinations or delusions are encouraging self harm or harm to others and intervene as appropriate  10/14/2023 0007 by Lonny Story LPN  Outcome: Progressing     Problem: Anxiety  Goal: Will report anxiety at manageable levels  Description: INTERVENTIONS:  1. Administer medication as ordered  2. Teach and rehearse alternative coping skills  3. Provide emotional support with 1:1 interaction with staff  10/14/2023 0007 by Lonny Story LPN  Outcome: Progressing  Flowsheets (Taken 10/13/2023 2307)  Will report anxiety at manageable levels:   Administer medication as ordered   Provide emotional support with 1:1 interaction with staff     Problem: Involuntary Admit  Goal: Will cooperate with staff recommendations and doctor's orders and will demonstrate appropriate behavior  Description: INTERVENTIONS:  1. Treat underlying conditions and offer medication as ordered  2. Educate regarding involuntary admission procedures and rules  3. Contain excessive/inappropriate behavior per unit and hospital policies  29/43/0146 [id number] by Lonny Story LPN  Outcome: Progressing  Flowsheets (Taken 10/13/2023 2307)  Will cooperate with staff recommendations and doctor's orders and will demonstrate appropriate behavior: Treat underlying conditions and offer medication as ordered     Problem: Self Care Deficit  Goal: Return ADL status to a safe level of function  Description: INTERVENTIONS:  1. Administer medication as ordered  2. Assess ADL deficits and provide assistive devices as needed  3.  Obtain
Problem: Chronic Conditions and Co-morbidities  Goal: Patient's chronic conditions and co-morbidity symptoms are monitored and maintained or improved  Outcome: Not Progressing     Cassandra Salazar stayed in his room all shift except for when he came out, walked around the unit and to the nurse station then back to his room. This happened at approximately 1800. Cassandra Salazar refused all care, he refused all medications, he refused vital signs and blood sugar checks. Cassandra Salazar was polite during all interactions with this nurse today. He politely stated that he did not want any medications or vital signs done. Jeremie ate all meals in his room. He ate 50-75% of each meal.      Cassandra Salazar denied suicidal and homicidal ideation this shift. He also denied hallucinations.
Problem: Chronic Conditions and Co-morbidities  Goal: Patient's chronic conditions and co-morbidity symptoms are monitored and maintained or improved  Outcome: Not Progressing     Problem: Psychosis  Goal: Will report no hallucinations or delusions  Description: INTERVENTIONS:  1. Administer medication as  ordered  2. Assist with reality testing to support increasing orientation  3. Assess if patient's hallucinations or delusions are encouraging self harm or harm to others and intervene as appropriate  Outcome: Not Progressing     Problem: Involuntary Admit  Goal: Will cooperate with staff recommendations and doctor's orders and will demonstrate appropriate behavior  Description: INTERVENTIONS:  1. Treat underlying conditions and offer medication as ordered  2. Educate regarding involuntary admission procedures and rules  3. Contain excessive/inappropriate behavior per unit and hospital policies  Outcome: Not Progressing     Pt continues to refuse care, vitals, and medications. States I just want to be left alone. Educated on the importance of med compliance with his medical conditions. Pt is delusional and states he does not need medications because he is not sick. Withdrawn to bedroom, napping off and on. Moves about independently with steady gait with walker. No aggressive or combative behaviors noted.
Problem: Chronic Conditions and Co-morbidities  Goal: Patient's chronic conditions and co-morbidity symptoms are monitored and maintained or improved  Outcome: Progressing     Problem: Psychosis  Goal: Will report no hallucinations or delusions  Description: INTERVENTIONS:  1. Administer medication as  ordered  2. Assist with reality testing to support increasing orientation  3. Assess if patient's hallucinations or delusions are encouraging self harm or harm to others and intervene as appropriate  Outcome: Progressing     Problem: Anxiety  Goal: Will report anxiety at manageable levels  Description: INTERVENTIONS:  1. Administer medication as ordered  2. Teach and rehearse alternative coping skills  3. Provide emotional support with 1:1 interaction with staff  Outcome: Progressing     Problem: Involuntary Admit  Goal: Will cooperate with staff recommendations and doctor's orders and will demonstrate appropriate behavior  Description: INTERVENTIONS:  1. Treat underlying conditions and offer medication as ordered  2. Educate regarding involuntary admission procedures and rules  3. Contain excessive/inappropriate behavior per unit and hospital policies  Outcome: Progressing     Problem: Self Care Deficit  Goal: Return ADL status to a safe level of function  Description: INTERVENTIONS:  1. Administer medication as ordered  2. Assess ADL deficits and provide assistive devices as needed  3. Obtain PT/OT consults as needed  4. Assist and instruct patient to increase activity and self care as tolerated  10/12/2023 2302 by Jannette Collier LPN  Outcome: Progressing     Problem: Safety - Adult  Goal: Free from fall injury  Outcome: Progressing     Problem: Self Care Deficit  Goal: Return ADL status to a safe level of function  Description: INTERVENTIONS:  1. Administer medication as ordered  2. Assess ADL deficits and provide assistive devices as needed  3. Obtain PT/OT consults as needed  4.  Assist and instruct patient to
Problem: Chronic Conditions and Co-morbidities  Goal: Patient's chronic conditions and co-morbidity symptoms are monitored and maintained or improved  Outcome: Progressing     Problem: Psychosis  Goal: Will report no hallucinations or delusions  Description: INTERVENTIONS:  1. Administer medication as  ordered  2. Assist with reality testing to support increasing orientation  3. Assess if patient's hallucinations or delusions are encouraging self harm or harm to others and intervene as appropriate  Outcome: Progressing     Problem: Anxiety  Goal: Will report anxiety at manageable levels  Description: INTERVENTIONS:  1. Administer medication as ordered  2. Teach and rehearse alternative coping skills  3. Provide emotional support with 1:1 interaction with staff  Outcome: Progressing  Flowsheets (Taken 10/9/2023 0026)  Will report anxiety at manageable levels:   Administer medication as ordered   Provide emotional support with 1:1 interaction with staff     Problem: Involuntary Admit  Goal: Will cooperate with staff recommendations and doctor's orders and will demonstrate appropriate behavior  Description: INTERVENTIONS:  1. Treat underlying conditions and offer medication as ordered  2. Educate regarding involuntary admission procedures and rules  3. Contain excessive/inappropriate behavior per unit and hospital policies  Outcome: Progressing  Flowsheets (Taken 10/9/2023 0026)  Will cooperate with staff recommendations and doctor's orders and will demonstrate appropriate behavior:   Treat underlying conditions and offer medication as ordered   Contain excessive/inappropriate behavior per unit and hospital policies     Problem: Self Care Deficit  Goal: Return ADL status to a safe level of function  Description: INTERVENTIONS:  1. Administer medication as ordered  2. Assess ADL deficits and provide assistive devices as needed  3. Obtain PT/OT consults as needed  4.  Assist and instruct patient to increase activity and
Problem: Involuntary Admit  Goal: Will cooperate with staff recommendations and doctor's orders and will demonstrate appropriate behavior  Description: INTERVENTIONS:  1. Treat underlying conditions and offer medication as ordered  2. Educate regarding involuntary admission procedures and rules  3. Contain excessive/inappropriate behavior per unit and hospital policies  Outcome: Not Progressing     Alexia Cao was withdrawn to his room all day. He ate breakfast and lunch in his room today. Alexia Cao refused vital signs and he refused medications today. When this nurse spoke to Alexia Cao about medications, Alexia Cao stated that he does not take any medications. Alexia Cao also refused a shower.
Problem: Psychosis  Goal: Will report no hallucinations or delusions  Description: INTERVENTIONS:  1. Administer medication as  ordered  2. Assist with reality testing to support increasing orientation  3. Assess if patient's hallucinations or delusions are encouraging self harm or harm to others and intervene as appropriate  Outcome: Not Progressing     Problem: Anxiety  Goal: Will report anxiety at manageable levels  Description: INTERVENTIONS:  1. Administer medication as ordered  2. Teach and rehearse alternative coping skills  3. Provide emotional support with 1:1 interaction with staff  Outcome: Not Progressing  Flowsheets (Taken 10/14/2023 2308)  Will report anxiety at manageable levels:   Administer medication as ordered   Teach and rehearse alternative coping skills   Provide emotional support with 1:1 interaction with staff     Problem: Chronic Conditions and Co-morbidities  Goal: Patient's chronic conditions and co-morbidity symptoms are monitored and maintained or improved  10/14/2023 2022 by Jesus Carter RN  Outcome: Not Progressing     Problem: Psychosis  Goal: Will report no hallucinations or delusions  Description: INTERVENTIONS:  1. Administer medication as  ordered  2. Assist with reality testing to support increasing orientation  3. Assess if patient's hallucinations or delusions are encouraging self harm or harm to others and intervene as appropriate  Outcome: Not Progressing     Problem: Anxiety  Goal: Will report anxiety at manageable levels  Description: INTERVENTIONS:  1. Administer medication as ordered  2. Teach and rehearse alternative coping skills  3.  Provide emotional support with 1:1 interaction with staff  Outcome: Not Progressing  Flowsheets (Taken 10/14/2023 2308)  Will report anxiety at manageable levels:   Administer medication as ordered   Teach and rehearse alternative coping skills   Provide emotional support with 1:1 interaction with staff
Problem: Psychosis  Goal: Will report no hallucinations or delusions  Description: INTERVENTIONS:  1. Administer medication as  ordered  2. Assist with reality testing to support increasing orientation  3. Assess if patient's hallucinations or delusions are encouraging self harm or harm to others and intervene as appropriate  Outcome: Not Progressing     Problem: Self Care Deficit  Goal: Return ADL status to a safe level of function  Description: INTERVENTIONS:  1. Administer medication as ordered  2. Assess ADL deficits and provide assistive devices as needed  3. Obtain PT/OT consults as needed  4. Assist and instruct patient to increase activity and self care as tolerated  Outcome: Progressing     Problem: Safety - Adult  Goal: Free from fall injury  Outcome: Progressing     Problem: Psychosis  Goal: Will report no hallucinations or delusions  Description: INTERVENTIONS:  1. Administer medication as  ordered  2. Assist with reality testing to support increasing orientation  3. Assess if patient's hallucinations or delusions are encouraging self harm or harm to others and intervene as appropriate  Outcome: Not Progressing   Patient is isolative and withdrawn to room. Not social with peers, minimally interactive with staff. Cooperative with vitals, but refuses all meds. Self-ambulating with walker with no concerns. Appears paranoid, but denies any SI/HI/AVH.
Problem: Psychosis  Goal: Will report no hallucinations or delusions  Outcome: Progressing     Problem: Anxiety  Goal: Will report anxiety at manageable levels  Outcome: Progressing     Problem: Involuntary Admit  Goal: Will cooperate with staff recommendations and doctor's orders and will demonstrate appropriate behavior  Outcome: Not Progressing   Patient has been isolative to the room, walked with  only. Refused vitals, medication and eye drops. Irritable and suspicious at times. Oriented x3. Denies SI/HI/AVH. Will continue to monitor.
Problem: Self Care Deficit  Goal: Return ADL status to a safe level of function  Description: INTERVENTIONS:  1. Administer medication as ordered  2. Assess ADL deficits and provide assistive devices as needed  3. Obtain PT/OT consults as needed  4. Assist and instruct patient to increase activity and self care as tolerated  Outcome: Lara Peters did not come out of his room this shift. He refused his vital signs assessment and his medications this shift. Royal Merino was pleasant when this nurse engaged him in conversation but when the subject turned to medications, Royal Merino stated that he does not take medications and that he does not want to talk about medications. There were no violent outbursts this shift.
Problem: Self Care Deficit  Goal: Return ADL status to a safe level of function  Description: INTERVENTIONS:  1. Administer medication as ordered  2. Assess ADL deficits and provide assistive devices as needed  3. Obtain PT/OT consults as needed  4. Assist and instruct patient to increase activity and self care as tolerated  Outcome: Not Progressing   Alex Fernandes spent the day in his room. He refused assessments, vital signs, medications and care. The only time Alex Fernandes came out of his room today was with the encouragement of his psychiatrist.  Alex Fernandes was in the day room for less than 5 minutes.
Problem: Self Care Deficit  Goal: Return ADL status to a safe level of function  Description: INTERVENTIONS:  1. Administer medication as ordered  2. Assess ADL deficits and provide assistive devices as needed  3. Obtain PT/OT consults as needed  4. Assist and instruct patient to increase activity and self care as tolerated  Outcome: Progressing     Problem: Safety - Adult  Goal: Free from fall injury  Outcome: Progressing     Problem: Psychosis  Goal: Will report no hallucinations or delusions  Description: INTERVENTIONS:  1. Administer medication as  ordered  2. Assist with reality testing to support increasing orientation  3. Assess if patient's hallucinations or delusions are encouraging self harm or harm to others and intervene as appropriate  Outcome: Not Progressing     Problem: Anxiety  Goal: Will report anxiety at manageable levels  Description: INTERVENTIONS:  1. Administer medication as ordered  2. Teach and rehearse alternative coping skills  3. Provide emotional support with 1:1 interaction with staff  Outcome: Not Progressing     Problem: Involuntary Admit  Goal: Will cooperate with staff recommendations and doctor's orders and will demonstrate appropriate behavior  Description: INTERVENTIONS:  1. Treat underlying conditions and offer medication as ordered  2. Educate regarding involuntary admission procedures and rules  3. Contain excessive/inappropriate behavior per unit and hospital policies  Outcome: Not Progressing     Patient withdrawn to room refuses all care, assessment, and medications. Rests throughout evening and night. No violent or aggressive behaviors noted.
Pt is quiet and withdrawn to his bedroom, refusing PO medications and vitals, no aggressive behaviors noted, good appetite, no s/s of distress.
Pt is withdrawn to bedroom, refusing care and medications, irritable with encouragement to receive treatment. Moves about independently with walker. Good appetite. No combative behaviors noted. States he just want to be left alone, denies SI HI AVH. Grandious and paranoid delusion present.
Pt. Is alert and oriented, delusional paranoid, grandiose. Refusing medications. Quiet and withdrawn to bedroom.  Ambulates independently with walker assist. No aggressive behaviors noted
Progressing  10/15/2023 1433 by Grace Avendano RN  Outcome: Progressing    Patient remains withdrawn to room. No interaction with others. Refused all care. Refused medications. Refused to be assessed. Up to bathroom with use of walker.

## 2023-10-19 ENCOUNTER — FOLLOWUP TELEPHONE ENCOUNTER (OUTPATIENT)
Dept: PSYCHIATRY | Age: 78
End: 2023-10-19

## 2023-10-19 NOTE — DISCHARGE SUMMARY
MICROZIDE     RisperDAL Consta 50 MG injection  Generic drug: risperiDONE microspheres ER     Valbenazine Tosylate 40 MG Caps     Vimpat 50 MG Tabs tablet  Generic drug: lacosamide     Vitamin D 1000 units Caps capsule  Commonly known as: CHOLECALCIFEROL               Where to Get Your Medications        You can get these medications from any pharmacy    Bring a paper prescription for each of these medications  paliperidone palmitate  MG/1.5ML Alisha IM injection         Follow-up Plan: The following was given to the patient at discharge: The crisis number for [facility] is [address] can use this number at any time to access emergency mental health services. Please follow up with your PCP regarding any pending labs. You are being discharged to [facility]. They will provide for your mental and physical healthcare needs. [facility]  Location: [address] [place] [address]   Phone: [phone]    More than 30 minutes were spent with the patient in completing this  evaluation and more than 50% of the time was spent completing this  evaluation, providing counseling, and planning treatment with the  patient.

## 2024-10-14 ENCOUNTER — TELEPHONE (OUTPATIENT)
Dept: INFECTIOUS DISEASES | Age: 79
End: 2024-10-14

## 2024-10-14 NOTE — TELEPHONE ENCOUNTER
Called the DON at [facility] to return call on scheduling NPV for patient. Patient currently on ART and needs to establish new ID MD.     I have asked DON to fax over recent (last year worth/6 month x2) of CBC w/ diff, CMP, CD4 and viral load labs. Fax number given and she verbalized understanding.     Patient scheduled for NPV on 11/12. DON verbalized understanding.

## 2024-11-12 ENCOUNTER — OFFICE VISIT (OUTPATIENT)
Dept: INFECTIOUS DISEASES | Age: 79
End: 2024-11-12
Payer: MEDICARE

## 2024-11-12 VITALS
SYSTOLIC BLOOD PRESSURE: 118 MMHG | HEIGHT: 68 IN | OXYGEN SATURATION: 94 % | BODY MASS INDEX: 26.52 KG/M2 | HEART RATE: 73 BPM | DIASTOLIC BLOOD PRESSURE: 68 MMHG | WEIGHT: 175 LBS

## 2024-11-12 DIAGNOSIS — B20 CURRENTLY ASYMPTOMATIC HIV INFECTION, WITH HISTORY OF HIV-RELATED ILLNESS (HCC): Primary | ICD-10-CM

## 2024-11-12 PROCEDURE — 3078F DIAST BP <80 MM HG: CPT | Performed by: INTERNAL MEDICINE

## 2024-11-12 PROCEDURE — G8427 DOCREV CUR MEDS BY ELIG CLIN: HCPCS | Performed by: INTERNAL MEDICINE

## 2024-11-12 PROCEDURE — 1123F ACP DISCUSS/DSCN MKR DOCD: CPT | Performed by: INTERNAL MEDICINE

## 2024-11-12 PROCEDURE — 4004F PT TOBACCO SCREEN RCVD TLK: CPT | Performed by: INTERNAL MEDICINE

## 2024-11-12 PROCEDURE — 1159F MED LIST DOCD IN RCRD: CPT | Performed by: INTERNAL MEDICINE

## 2024-11-12 PROCEDURE — G8419 CALC BMI OUT NRM PARAM NOF/U: HCPCS | Performed by: INTERNAL MEDICINE

## 2024-11-12 PROCEDURE — 99204 OFFICE O/P NEW MOD 45 MIN: CPT | Performed by: INTERNAL MEDICINE

## 2024-11-12 PROCEDURE — G8484 FLU IMMUNIZE NO ADMIN: HCPCS | Performed by: INTERNAL MEDICINE

## 2024-11-12 PROCEDURE — 3074F SYST BP LT 130 MM HG: CPT | Performed by: INTERNAL MEDICINE

## 2024-11-12 NOTE — PROGRESS NOTES
diseases.      Allergies   Allergen Reactions    Haloperidol      Cannot take this medication    Valproic Acid      Hyperammonemia    Ziprasidone      Cannot take this medication          REVIEW OF SYSTEMS:    CONSTITUTIONAL:  negative for fevers, chills, weight change.   EYES:  negative for blurred vision, eye discharge, acute visual disturbance and icterus  HEENT:  negative for acute hearing loss, tinnitus, ear drainage, sinus pressure, nasal congestion, epistaxis and snoring  RESPIRATORY:  No cough, shortness of breath, hemoptysis  CARDIOVASCULAR:  negative for chest pain, palpitations  GASTROINTESTINAL:  negative for nausea, vomiting  GENITOURINARY:  negative for frequency, dysuria  HEMATOLOGIC/LYMPHATIC:  negative for easy bruising, bleeding and lymphadenopathy  ALLERGIC/IMMUNOLOGIC:  negative for recurrent infections, angioedema, anaphylaxis and drug reactions  ENDOCRINE:  negative for weight changes and diabetic symptoms including polyuria, polydipsia and polyphagia  MUSCULOSKELETAL:   C/o gout pain knees   NEUROLOGICAL:  negative for headaches, slurred speech, unilateral weakness  PSYCHIATRIC/BEHAVIORAL: negative for hallucinations, behavioral problems, confusion and agitation.       Objective:   PHYSICAL EXAM:      VITALS:  There were no vitals taken for this visit.     24HR INTAKE/OUTPUT:  No intake or output data in the 24 hours ending 11/12/24 1005  CONSTITUTIONAL:  Awake, alert, cooperative, no apparent distress  Appears stated age  In wheelchair  HEENT: NCAT, PERRL, EOMI.  Sclera white, conjunctiva full.  Scant exudate R eye   OP with moist mucosal membranes, no thrush, tongue protrudes midline  Edentulous   NECK:  Supple, symmetrical, trachea midline, no adenopathy  LUNGS:  no increased work of breathing  CTA randall without W/R/R  CARDIOVASCULAR:  RRR without murmur  ABDOMEN:  normal bowel sounds, soft, flat, NT   PSYCHIATRIC: Oriented to person, no obvious depression or anxiety.  MUSCULOSKELETAL: No

## 2025-02-05 ENCOUNTER — APPOINTMENT (OUTPATIENT)
Dept: CT IMAGING | Age: 80
DRG: 871 | End: 2025-02-05
Payer: MEDICARE

## 2025-02-05 ENCOUNTER — HOSPITAL ENCOUNTER (INPATIENT)
Age: 80
LOS: 5 days | Discharge: NURSING, REHAB OR LONG-TERM CARE | DRG: 871 | End: 2025-02-10
Attending: EMERGENCY MEDICINE | Admitting: STUDENT IN AN ORGANIZED HEALTH CARE EDUCATION/TRAINING PROGRAM
Payer: MEDICARE

## 2025-02-05 ENCOUNTER — APPOINTMENT (OUTPATIENT)
Dept: GENERAL RADIOLOGY | Age: 80
DRG: 871 | End: 2025-02-05
Payer: MEDICARE

## 2025-02-05 DIAGNOSIS — A41.9 SEPTICEMIA (HCC): Primary | ICD-10-CM

## 2025-02-05 DIAGNOSIS — R94.31 PROLONGED QT INTERVAL: ICD-10-CM

## 2025-02-05 DIAGNOSIS — S09.90XA CLOSED HEAD INJURY, INITIAL ENCOUNTER: ICD-10-CM

## 2025-02-05 DIAGNOSIS — I21.4 NSTEMI (NON-ST ELEVATED MYOCARDIAL INFARCTION) (HCC): ICD-10-CM

## 2025-02-05 DIAGNOSIS — I26.99 ACUTE PULMONARY EMBOLISM WITHOUT ACUTE COR PULMONALE, UNSPECIFIED PULMONARY EMBOLISM TYPE (HCC): ICD-10-CM

## 2025-02-05 LAB
ALBUMIN SERPL-MCNC: 3.9 G/DL (ref 3.4–5)
ALBUMIN/GLOB SERPL: 1.3 {RATIO} (ref 1.1–2.2)
ALP SERPL-CCNC: 57 U/L (ref 40–129)
ALT SERPL-CCNC: 11 U/L (ref 10–40)
ANION GAP SERPL CALCULATED.3IONS-SCNC: 13 MMOL/L (ref 3–16)
AST SERPL-CCNC: 34 U/L (ref 15–37)
BACTERIA URNS QL MICRO: ABNORMAL /HPF
BASE EXCESS BLDV CALC-SCNC: -1.3 MMOL/L (ref -3–3)
BASOPHILS # BLD: 0 K/UL (ref 0–0.2)
BASOPHILS NFR BLD: 0.2 %
BILIRUB SERPL-MCNC: 0.5 MG/DL (ref 0–1)
BILIRUB UR QL STRIP.AUTO: NEGATIVE
BUN SERPL-MCNC: 16 MG/DL (ref 7–20)
CALCIUM SERPL-MCNC: 8.7 MG/DL (ref 8.3–10.6)
CHLORIDE SERPL-SCNC: 107 MMOL/L (ref 99–110)
CLARITY UR: CLEAR
CO2 BLDV-SCNC: 24 MMOL/L
CO2 SERPL-SCNC: 23 MMOL/L (ref 21–32)
COARSE GRAN CASTS #/AREA URNS LPF: ABNORMAL /LPF (ref 0–2)
COHGB MFR BLDV: 3.5 % (ref 0–1.5)
COLOR UR: YELLOW
CREAT SERPL-MCNC: 1.1 MG/DL (ref 0.8–1.3)
DEPRECATED RDW RBC AUTO: 13.6 % (ref 12.4–15.4)
EOSINOPHIL # BLD: 0 K/UL (ref 0–0.6)
EOSINOPHIL NFR BLD: 0.1 %
EPI CELLS #/AREA URNS HPF: ABNORMAL /HPF (ref 0–5)
FLUAV RNA UPPER RESP QL NAA+PROBE: NEGATIVE
FLUBV AG NPH QL: NEGATIVE
GFR SERPLBLD CREATININE-BSD FMLA CKD-EPI: 69 ML/MIN/{1.73_M2}
GLUCOSE SERPL-MCNC: 126 MG/DL (ref 70–99)
GLUCOSE UR STRIP.AUTO-MCNC: NEGATIVE MG/DL
HCO3 BLDV-SCNC: 22.7 MMOL/L (ref 23–29)
HCT VFR BLD AUTO: 39.3 % (ref 40.5–52.5)
HGB BLD-MCNC: 13.6 G/DL (ref 13.5–17.5)
HGB UR QL STRIP.AUTO: ABNORMAL
KETONES UR STRIP.AUTO-MCNC: ABNORMAL MG/DL
LACTATE BLDV-SCNC: 2.6 MMOL/L (ref 0.4–1.9)
LACTATE BLDV-SCNC: 3 MMOL/L (ref 0.4–1.9)
LEUKOCYTE ESTERASE UR QL STRIP.AUTO: NEGATIVE
LYMPHOCYTES # BLD: 0.4 K/UL (ref 1–5.1)
LYMPHOCYTES NFR BLD: 3.4 %
MAGNESIUM SERPL-MCNC: 1.6 MG/DL (ref 1.8–2.4)
MCH RBC QN AUTO: 34.3 PG (ref 26–34)
MCHC RBC AUTO-ENTMCNC: 34.7 G/DL (ref 31–36)
MCV RBC AUTO: 98.8 FL (ref 80–100)
METHGB MFR BLDV: 0.3 %
MONOCYTES # BLD: 0.7 K/UL (ref 0–1.3)
MONOCYTES NFR BLD: 6.4 %
MUCOUS THREADS #/AREA URNS LPF: ABNORMAL /LPF
NEUTROPHILS # BLD: 10.3 K/UL (ref 1.7–7.7)
NEUTROPHILS NFR BLD: 89.9 %
NITRITE UR QL STRIP.AUTO: NEGATIVE
O2 THERAPY: ABNORMAL
PCO2 BLDV: 35.9 MMHG (ref 40–50)
PH BLDV: 7.42 [PH] (ref 7.35–7.45)
PH UR STRIP.AUTO: 6 [PH] (ref 5–8)
PLATELET # BLD AUTO: 165 K/UL (ref 135–450)
PMV BLD AUTO: 7.9 FL (ref 5–10.5)
PO2 BLDV: 70.2 MMHG (ref 25–40)
POTASSIUM SERPL-SCNC: 3.6 MMOL/L (ref 3.5–5.1)
PROT SERPL-MCNC: 7 G/DL (ref 6.4–8.2)
PROT UR STRIP.AUTO-MCNC: 30 MG/DL
RBC # BLD AUTO: 3.98 M/UL (ref 4.2–5.9)
RBC #/AREA URNS HPF: ABNORMAL /HPF (ref 0–4)
RSV AG NOSE QL: NEGATIVE
SAO2 % BLDV: 95 %
SARS-COV-2 RDRP RESP QL NAA+PROBE: NOT DETECTED
SODIUM SERPL-SCNC: 143 MMOL/L (ref 136–145)
SP GR UR STRIP.AUTO: >=1.03 (ref 1–1.03)
TROPONIN, HIGH SENSITIVITY: 195 NG/L (ref 0–22)
TROPONIN, HIGH SENSITIVITY: 274 NG/L (ref 0–22)
UA COMPLETE W REFLEX CULTURE PNL UR: ABNORMAL
UA DIPSTICK W REFLEX MICRO PNL UR: YES
URN SPEC COLLECT METH UR: ABNORMAL
UROBILINOGEN UR STRIP-ACNC: 0.2 E.U./DL
WBC # BLD AUTO: 11.4 K/UL (ref 4–11)
WBC #/AREA URNS HPF: ABNORMAL /HPF (ref 0–5)

## 2025-02-05 PROCEDURE — 71045 X-RAY EXAM CHEST 1 VIEW: CPT

## 2025-02-05 PROCEDURE — 93005 ELECTROCARDIOGRAM TRACING: CPT | Performed by: NURSE PRACTITIONER

## 2025-02-05 PROCEDURE — 87635 SARS-COV-2 COVID-19 AMP PRB: CPT

## 2025-02-05 PROCEDURE — 87449 NOS EACH ORGANISM AG IA: CPT

## 2025-02-05 PROCEDURE — 72125 CT NECK SPINE W/O DYE: CPT

## 2025-02-05 PROCEDURE — 80053 COMPREHEN METABOLIC PANEL: CPT

## 2025-02-05 PROCEDURE — 36415 COLL VENOUS BLD VENIPUNCTURE: CPT

## 2025-02-05 PROCEDURE — 81001 URINALYSIS AUTO W/SCOPE: CPT

## 2025-02-05 PROCEDURE — 85025 COMPLETE CBC W/AUTO DIFF WBC: CPT

## 2025-02-05 PROCEDURE — 70450 CT HEAD/BRAIN W/O DYE: CPT

## 2025-02-05 PROCEDURE — 2580000003 HC RX 258: Performed by: NURSE PRACTITIONER

## 2025-02-05 PROCEDURE — 87807 RSV ASSAY W/OPTIC: CPT

## 2025-02-05 PROCEDURE — 99285 EMERGENCY DEPT VISIT HI MDM: CPT

## 2025-02-05 PROCEDURE — 6360000002 HC RX W HCPCS: Performed by: NURSE PRACTITIONER

## 2025-02-05 PROCEDURE — 96365 THER/PROPH/DIAG IV INF INIT: CPT

## 2025-02-05 PROCEDURE — 84484 ASSAY OF TROPONIN QUANT: CPT

## 2025-02-05 PROCEDURE — 82803 BLOOD GASES ANY COMBINATION: CPT

## 2025-02-05 PROCEDURE — 2060000000 HC ICU INTERMEDIATE R&B

## 2025-02-05 PROCEDURE — 87804 INFLUENZA ASSAY W/OPTIC: CPT

## 2025-02-05 PROCEDURE — 83605 ASSAY OF LACTIC ACID: CPT

## 2025-02-05 PROCEDURE — 6370000000 HC RX 637 (ALT 250 FOR IP): Performed by: NURSE PRACTITIONER

## 2025-02-05 PROCEDURE — 87040 BLOOD CULTURE FOR BACTERIA: CPT

## 2025-02-05 PROCEDURE — 83735 ASSAY OF MAGNESIUM: CPT

## 2025-02-05 PROCEDURE — 96375 TX/PRO/DX INJ NEW DRUG ADDON: CPT

## 2025-02-05 RX ORDER — HEPARIN SODIUM 1000 [USP'U]/ML
4000 INJECTION, SOLUTION INTRAVENOUS; SUBCUTANEOUS ONCE
Status: COMPLETED | OUTPATIENT
Start: 2025-02-06 | End: 2025-02-06

## 2025-02-05 RX ORDER — SODIUM CHLORIDE 0.9 % (FLUSH) 0.9 %
5-40 SYRINGE (ML) INJECTION EVERY 12 HOURS SCHEDULED
Status: DISCONTINUED | OUTPATIENT
Start: 2025-02-05 | End: 2025-02-10 | Stop reason: HOSPADM

## 2025-02-05 RX ORDER — SODIUM CHLORIDE 0.9 % (FLUSH) 0.9 %
5-40 SYRINGE (ML) INJECTION PRN
Status: DISCONTINUED | OUTPATIENT
Start: 2025-02-05 | End: 2025-02-10 | Stop reason: HOSPADM

## 2025-02-05 RX ORDER — ACETAMINOPHEN 500 MG
1000 TABLET ORAL ONCE
Status: DISCONTINUED | OUTPATIENT
Start: 2025-02-05 | End: 2025-02-05

## 2025-02-05 RX ORDER — HEPARIN SODIUM 1000 [USP'U]/ML
4000 INJECTION, SOLUTION INTRAVENOUS; SUBCUTANEOUS PRN
Status: DISCONTINUED | OUTPATIENT
Start: 2025-02-05 | End: 2025-02-09

## 2025-02-05 RX ORDER — SODIUM CHLORIDE 9 MG/ML
INJECTION, SOLUTION INTRAVENOUS PRN
Status: DISCONTINUED | OUTPATIENT
Start: 2025-02-05 | End: 2025-02-10 | Stop reason: HOSPADM

## 2025-02-05 RX ORDER — POTASSIUM CHLORIDE 1500 MG/1
40 TABLET, EXTENDED RELEASE ORAL PRN
Status: DISCONTINUED | OUTPATIENT
Start: 2025-02-05 | End: 2025-02-10 | Stop reason: HOSPADM

## 2025-02-05 RX ORDER — ONDANSETRON 2 MG/ML
4 INJECTION INTRAMUSCULAR; INTRAVENOUS EVERY 6 HOURS PRN
Status: DISCONTINUED | OUTPATIENT
Start: 2025-02-05 | End: 2025-02-10 | Stop reason: HOSPADM

## 2025-02-05 RX ORDER — HEPARIN SODIUM 1000 [USP'U]/ML
2000 INJECTION, SOLUTION INTRAVENOUS; SUBCUTANEOUS PRN
Status: DISCONTINUED | OUTPATIENT
Start: 2025-02-05 | End: 2025-02-09

## 2025-02-05 RX ORDER — IPRATROPIUM BROMIDE AND ALBUTEROL SULFATE 2.5; .5 MG/3ML; MG/3ML
1 SOLUTION RESPIRATORY (INHALATION) ONCE
Status: COMPLETED | OUTPATIENT
Start: 2025-02-05 | End: 2025-02-05

## 2025-02-05 RX ORDER — ACETAMINOPHEN 650 MG/1
650 SUPPOSITORY RECTAL ONCE
Status: COMPLETED | OUTPATIENT
Start: 2025-02-05 | End: 2025-02-05

## 2025-02-05 RX ORDER — POTASSIUM CHLORIDE 7.45 MG/ML
10 INJECTION INTRAVENOUS PRN
Status: DISCONTINUED | OUTPATIENT
Start: 2025-02-05 | End: 2025-02-10 | Stop reason: HOSPADM

## 2025-02-05 RX ORDER — MAGNESIUM SULFATE IN WATER 40 MG/ML
2000 INJECTION, SOLUTION INTRAVENOUS PRN
Status: DISCONTINUED | OUTPATIENT
Start: 2025-02-05 | End: 2025-02-06

## 2025-02-05 RX ORDER — HEPARIN SODIUM 10000 [USP'U]/100ML
11 INJECTION, SOLUTION INTRAVENOUS CONTINUOUS
Status: DISCONTINUED | OUTPATIENT
Start: 2025-02-06 | End: 2025-02-08

## 2025-02-05 RX ORDER — VANCOMYCIN 1.5 G/300ML
1500 INJECTION, SOLUTION INTRAVENOUS ONCE
Status: COMPLETED | OUTPATIENT
Start: 2025-02-05 | End: 2025-02-06

## 2025-02-05 RX ORDER — MAGNESIUM SULFATE IN WATER 40 MG/ML
2000 INJECTION, SOLUTION INTRAVENOUS ONCE
Status: COMPLETED | OUTPATIENT
Start: 2025-02-05 | End: 2025-02-05

## 2025-02-05 RX ORDER — ONDANSETRON 4 MG/1
4 TABLET, ORALLY DISINTEGRATING ORAL EVERY 8 HOURS PRN
Status: DISCONTINUED | OUTPATIENT
Start: 2025-02-05 | End: 2025-02-10 | Stop reason: HOSPADM

## 2025-02-05 RX ORDER — ACETAMINOPHEN 325 MG/1
650 TABLET ORAL EVERY 6 HOURS PRN
Status: DISCONTINUED | OUTPATIENT
Start: 2025-02-05 | End: 2025-02-10 | Stop reason: HOSPADM

## 2025-02-05 RX ORDER — POLYETHYLENE GLYCOL 3350 17 G/17G
17 POWDER, FOR SOLUTION ORAL DAILY PRN
Status: DISCONTINUED | OUTPATIENT
Start: 2025-02-05 | End: 2025-02-09

## 2025-02-05 RX ORDER — 0.9 % SODIUM CHLORIDE 0.9 %
1000 INTRAVENOUS SOLUTION INTRAVENOUS ONCE
Status: COMPLETED | OUTPATIENT
Start: 2025-02-05 | End: 2025-02-05

## 2025-02-05 RX ORDER — ENOXAPARIN SODIUM 100 MG/ML
40 INJECTION SUBCUTANEOUS DAILY
Status: DISCONTINUED | OUTPATIENT
Start: 2025-02-06 | End: 2025-02-05

## 2025-02-05 RX ORDER — ACETAMINOPHEN 650 MG/1
650 SUPPOSITORY RECTAL EVERY 6 HOURS PRN
Status: DISCONTINUED | OUTPATIENT
Start: 2025-02-05 | End: 2025-02-10 | Stop reason: HOSPADM

## 2025-02-05 RX ADMIN — IPRATROPIUM BROMIDE AND ALBUTEROL SULFATE 1 DOSE: 2.5; .5 SOLUTION RESPIRATORY (INHALATION) at 21:42

## 2025-02-05 RX ADMIN — VANCOMYCIN 1500 MG: 1.5 INJECTION, SOLUTION INTRAVENOUS at 22:43

## 2025-02-05 RX ADMIN — SODIUM CHLORIDE 1000 ML: 9 INJECTION, SOLUTION INTRAVENOUS at 20:51

## 2025-02-05 RX ADMIN — CEFEPIME 2000 MG: 2 INJECTION, POWDER, FOR SOLUTION INTRAVENOUS at 21:45

## 2025-02-05 RX ADMIN — ACETAMINOPHEN 650 MG: 650 SUPPOSITORY RECTAL at 21:34

## 2025-02-05 RX ADMIN — MAGNESIUM SULFATE HEPTAHYDRATE 2000 MG: 2 INJECTION, SOLUTION INTRAVENOUS at 20:54

## 2025-02-05 RX ADMIN — IPRATROPIUM BROMIDE AND ALBUTEROL SULFATE 1 DOSE: 2.5; .5 SOLUTION RESPIRATORY (INHALATION) at 21:30

## 2025-02-06 ENCOUNTER — APPOINTMENT (OUTPATIENT)
Age: 80
DRG: 871 | End: 2025-02-06
Attending: STUDENT IN AN ORGANIZED HEALTH CARE EDUCATION/TRAINING PROGRAM
Payer: MEDICARE

## 2025-02-06 ENCOUNTER — APPOINTMENT (OUTPATIENT)
Dept: CT IMAGING | Age: 80
DRG: 871 | End: 2025-02-06
Payer: MEDICARE

## 2025-02-06 PROBLEM — R94.31 PROLONGED QT INTERVAL: Status: ACTIVE | Noted: 2025-02-06

## 2025-02-06 PROBLEM — J11.1 FLU: Status: ACTIVE | Noted: 2025-02-06

## 2025-02-06 PROBLEM — I21.4 NSTEMI (NON-ST ELEVATED MYOCARDIAL INFARCTION) (HCC): Status: ACTIVE | Noted: 2025-02-06

## 2025-02-06 PROBLEM — I26.99 ACUTE PULMONARY EMBOLISM WITHOUT ACUTE COR PULMONALE (HCC): Status: ACTIVE | Noted: 2025-02-06

## 2025-02-06 PROBLEM — I50.21 ACUTE HEART FAILURE WITH REDUCED EJECTION FRACTION (HFREF, <= 40%) (HCC): Status: ACTIVE | Noted: 2025-02-06

## 2025-02-06 LAB
ALBUMIN SERPL-MCNC: 3.5 G/DL (ref 3.4–5)
ALP SERPL-CCNC: 50 U/L (ref 40–129)
ALT SERPL-CCNC: 15 U/L (ref 10–40)
ANION GAP SERPL CALCULATED.3IONS-SCNC: 12 MMOL/L (ref 3–16)
ANTI-XA UNFRAC HEPARIN: 0.42 IU/ML (ref 0.3–0.7)
ANTI-XA UNFRAC HEPARIN: 0.48 IU/ML (ref 0.3–0.7)
APTT BLD: 30.9 SEC (ref 22.1–36.4)
AST SERPL-CCNC: 88 U/L (ref 15–37)
BASE EXCESS BLDV CALC-SCNC: -5.4 MMOL/L (ref -3–3)
BASE EXCESS BLDV CALC-SCNC: -8 MMOL/L (ref -3–3)
BASOPHILS # BLD: 0 K/UL (ref 0–0.2)
BASOPHILS NFR BLD: 0.3 %
BILIRUB DIRECT SERPL-MCNC: 0.2 MG/DL (ref 0–0.3)
BILIRUB INDIRECT SERPL-MCNC: 0.3 MG/DL (ref 0–1)
BILIRUB SERPL-MCNC: 0.5 MG/DL (ref 0–1)
BUN SERPL-MCNC: 14 MG/DL (ref 7–20)
CALCIUM SERPL-MCNC: 8.1 MG/DL (ref 8.3–10.6)
CHLORIDE SERPL-SCNC: 109 MMOL/L (ref 99–110)
CHOLEST SERPL-MCNC: 138 MG/DL (ref 0–199)
CO2 BLDV-SCNC: 20 MMOL/L
CO2 BLDV-SCNC: 22 MMOL/L
CO2 SERPL-SCNC: 21 MMOL/L (ref 21–32)
COHGB MFR BLDV: 1.1 % (ref 0–1.5)
COHGB MFR BLDV: 1.9 % (ref 0–1.5)
CREAT SERPL-MCNC: 0.9 MG/DL (ref 0.8–1.3)
DEPRECATED RDW RBC AUTO: 13.7 % (ref 12.4–15.4)
ECHO AO ROOT DIAM: 3.4 CM
ECHO AO ROOT INDEX: 1.73 CM/M2
ECHO AV MEAN GRADIENT: 3 MMHG
ECHO AV MEAN GRADIENT: 3 MMHG
ECHO AV MEAN VELOCITY: 0.8 M/S
ECHO AV PEAK GRADIENT: 6 MMHG
ECHO AV PEAK VELOCITY: 1.2 M/S
ECHO AV VELOCITY RATIO: 0.58
ECHO AV VTI: 19.7 CM
ECHO BSA: 1.98 M2
ECHO BSA: 1.98 M2
ECHO IVC EXP: 2.5 CM
ECHO IVC INSP: 1.5 CM
ECHO LA AREA 4C: 11.3 CM2
ECHO LA MAJOR AXIS: 4 CM
ECHO LA VOL MOD A4C: 25 ML (ref 18–58)
ECHO LA VOLUME INDEX MOD A4C: 13 ML/M2 (ref 16–34)
ECHO LV E' LATERAL VELOCITY: 5.98 CM/S
ECHO LV E' SEPTAL VELOCITY: 6.31 CM/S
ECHO LV EF PHYSICIAN: 30 %
ECHO LV FRACTIONAL SHORTENING: 22 % (ref 28–44)
ECHO LV INTERNAL DIMENSION DIASTOLE INDEX: 2.5 CM/M2
ECHO LV INTERNAL DIMENSION DIASTOLIC: 4.9 CM (ref 4.2–5.9)
ECHO LV INTERNAL DIMENSION SYSTOLIC INDEX: 1.94 CM/M2
ECHO LV INTERNAL DIMENSION SYSTOLIC: 3.8 CM
ECHO LV IVSD: 1 CM (ref 0.6–1)
ECHO LV MASS 2D: 176 G (ref 88–224)
ECHO LV MASS INDEX 2D: 89.8 G/M2 (ref 49–115)
ECHO LV POSTERIOR WALL DIASTOLIC: 1 CM (ref 0.6–1)
ECHO LV RELATIVE WALL THICKNESS RATIO: 0.41
ECHO LVOT AV VTI INDEX: 0.54
ECHO LVOT MEAN GRADIENT: 1 MMHG
ECHO LVOT PEAK GRADIENT: 2 MMHG
ECHO LVOT PEAK VELOCITY: 0.7 M/S
ECHO LVOT VTI: 10.7 CM
ECHO MV A VELOCITY: 1.33 M/S
ECHO MV E DECELERATION TIME (DT): 137 MS
ECHO MV E VELOCITY: 1 M/S
ECHO MV E/A RATIO: 0.75
ECHO MV E/E' LATERAL: 16.72
ECHO MV E/E' RATIO (AVERAGED): 16.29
ECHO MV E/E' SEPTAL: 15.85
ECHO PV MAX VELOCITY: 0.7 M/S
ECHO PV MEAN GRADIENT: 1 MMHG
ECHO PV MEAN VELOCITY: 0.5 M/S
ECHO PV PEAK GRADIENT: 2 MMHG
ECHO PV VTI: 11.9 CM
ECHO RA AREA 4C: 6.9 CM2
ECHO RA END SYSTOLIC VOLUME APICAL 4 CHAMBER INDEX BSA: 6 ML/M2
ECHO RA VOLUME: 11 ML
ECHO RV BASAL DIMENSION: 1.4 CM
ECHO RV FREE WALL PEAK S': 15 CM/S
ECHO RV LONGITUDINAL DIMENSION: 7.7 CM
ECHO RV MID DIMENSION: 1.3 CM
ECHO RV TAPSE: 2.2 CM (ref 1.7–?)
EKG ATRIAL RATE: 112 BPM
EKG DIAGNOSIS: NORMAL
EKG P AXIS: 55 DEGREES
EKG P-R INTERVAL: 112 MS
EKG Q-T INTERVAL: 412 MS
EKG Q-T INTERVAL: 456 MS
EKG Q-T INTERVAL: 474 MS
EKG QRS DURATION: 76 MS
EKG QRS DURATION: 82 MS
EKG QRS DURATION: 88 MS
EKG QTC CALCULATION (BAZETT): 598 MS
EKG QTC CALCULATION (BAZETT): 630 MS
EKG QTC CALCULATION (BAZETT): 647 MS
EKG R AXIS: 21 DEGREES
EKG R AXIS: 32 DEGREES
EKG R AXIS: 48 DEGREES
EKG T AXIS: 77 DEGREES
EKG T AXIS: 78 DEGREES
EKG T AXIS: 84 DEGREES
EKG VENTRICULAR RATE: 112 BPM
EKG VENTRICULAR RATE: 115 BPM
EKG VENTRICULAR RATE: 127 BPM
EOSINOPHIL # BLD: 0 K/UL (ref 0–0.6)
EOSINOPHIL NFR BLD: 0 %
GFR SERPLBLD CREATININE-BSD FMLA CKD-EPI: 88 ML/MIN/{1.73_M2}
GLUCOSE BLD-MCNC: 133 MG/DL (ref 70–99)
GLUCOSE SERPL-MCNC: 106 MG/DL (ref 70–99)
HCO3 BLDV-SCNC: 18.5 MMOL/L (ref 23–29)
HCO3 BLDV-SCNC: 20.6 MMOL/L (ref 23–29)
HCT VFR BLD AUTO: 36.7 % (ref 40.5–52.5)
HDLC SERPL-MCNC: 31 MG/DL (ref 40–60)
HGB BLD-MCNC: 12.8 G/DL (ref 13.5–17.5)
INR PPP: 1.28 (ref 0.85–1.15)
LACTATE BLDV-SCNC: 1.1 MMOL/L (ref 0.4–2)
LDLC SERPL CALC-MCNC: 99 MG/DL
LYMPHOCYTES # BLD: 0.6 K/UL (ref 1–5.1)
LYMPHOCYTES NFR BLD: 5.6 %
MAGNESIUM SERPL-MCNC: 1.89 MG/DL (ref 1.8–2.4)
MCH RBC QN AUTO: 34.5 PG (ref 26–34)
MCHC RBC AUTO-ENTMCNC: 34.7 G/DL (ref 31–36)
MCV RBC AUTO: 99.5 FL (ref 80–100)
METHGB MFR BLDV: 0.3 %
METHGB MFR BLDV: 0.3 %
MONOCYTES # BLD: 0.9 K/UL (ref 0–1.3)
MONOCYTES NFR BLD: 8.6 %
NEUTROPHILS # BLD: 8.6 K/UL (ref 1.7–7.7)
NEUTROPHILS NFR BLD: 85.5 %
NT-PROBNP SERPL-MCNC: 3290 PG/ML (ref 0–449)
O2 CT VFR BLDV CALC: 18 VOL %
O2 CT VFR BLDV CALC: 19 VOL %
O2 THERAPY: ABNORMAL
O2 THERAPY: ABNORMAL
ORGANISM: ABNORMAL
PCO2 BLDV: 41.6 MMHG (ref 40–50)
PCO2 BLDV: 41.7 MMHG (ref 40–50)
PERFORMED ON: ABNORMAL
PH BLDV: 7.27 [PH] (ref 7.35–7.45)
PH BLDV: 7.31 [PH] (ref 7.35–7.45)
PLATELET # BLD AUTO: 154 K/UL (ref 135–450)
PMV BLD AUTO: 8 FL (ref 5–10.5)
PO2 BLDV: 182.1 MMHG (ref 25–40)
PO2 BLDV: 58.2 MMHG (ref 25–40)
POTASSIUM SERPL-SCNC: 4 MMOL/L (ref 3.5–5.1)
PROCALCITONIN SERPL IA-MCNC: 0.1 NG/ML (ref 0–0.15)
PROT SERPL-MCNC: 6.3 G/DL (ref 6.4–8.2)
PROTHROMBIN TIME: 16.2 SEC (ref 11.9–14.9)
RBC # BLD AUTO: 3.69 M/UL (ref 4.2–5.9)
REPORT: NORMAL
RESP PATH DNA+RNA PNL NPH NAA+NON-PROBE: ABNORMAL
SAO2 % BLDV: 87 %
SAO2 % BLDV: 99 %
SODIUM SERPL-SCNC: 142 MMOL/L (ref 136–145)
TRIGL SERPL-MCNC: 39 MG/DL (ref 0–150)
TROPONIN, HIGH SENSITIVITY: 588 NG/L (ref 0–22)
TROPONIN, HIGH SENSITIVITY: 602 NG/L (ref 0–22)
TROPONIN, HIGH SENSITIVITY: 776 NG/L (ref 0–22)
VLDLC SERPL CALC-MCNC: 8 MG/DL
WBC # BLD AUTO: 10.1 K/UL (ref 4–11)

## 2025-02-06 PROCEDURE — 6360000002 HC RX W HCPCS: Performed by: STUDENT IN AN ORGANIZED HEALTH CARE EDUCATION/TRAINING PROGRAM

## 2025-02-06 PROCEDURE — 80061 LIPID PANEL: CPT

## 2025-02-06 PROCEDURE — 86360 T CELL ABSOLUTE COUNT/RATIO: CPT

## 2025-02-06 PROCEDURE — 83605 ASSAY OF LACTIC ACID: CPT

## 2025-02-06 PROCEDURE — 85520 HEPARIN ASSAY: CPT

## 2025-02-06 PROCEDURE — 2500000003 HC RX 250 WO HCPCS: Performed by: INTERNAL MEDICINE

## 2025-02-06 PROCEDURE — 6360000002 HC RX W HCPCS: Performed by: INTERNAL MEDICINE

## 2025-02-06 PROCEDURE — 93970 EXTREMITY STUDY: CPT | Performed by: SURGERY

## 2025-02-06 PROCEDURE — 6360000004 HC RX CONTRAST MEDICATION: Performed by: INTERNAL MEDICINE

## 2025-02-06 PROCEDURE — 83735 ASSAY OF MAGNESIUM: CPT

## 2025-02-06 PROCEDURE — 2500000003 HC RX 250 WO HCPCS

## 2025-02-06 PROCEDURE — 80048 BASIC METABOLIC PNL TOTAL CA: CPT

## 2025-02-06 PROCEDURE — 93306 TTE W/DOPPLER COMPLETE: CPT | Performed by: STUDENT IN AN ORGANIZED HEALTH CARE EDUCATION/TRAINING PROGRAM

## 2025-02-06 PROCEDURE — 85730 THROMBOPLASTIN TIME PARTIAL: CPT

## 2025-02-06 PROCEDURE — 2060000000 HC ICU INTERMEDIATE R&B

## 2025-02-06 PROCEDURE — 83880 ASSAY OF NATRIURETIC PEPTIDE: CPT

## 2025-02-06 PROCEDURE — 94640 AIRWAY INHALATION TREATMENT: CPT

## 2025-02-06 PROCEDURE — 36415 COLL VENOUS BLD VENIPUNCTURE: CPT

## 2025-02-06 PROCEDURE — 6370000000 HC RX 637 (ALT 250 FOR IP): Performed by: STUDENT IN AN ORGANIZED HEALTH CARE EDUCATION/TRAINING PROGRAM

## 2025-02-06 PROCEDURE — 84145 PROCALCITONIN (PCT): CPT

## 2025-02-06 PROCEDURE — 2500000003 HC RX 250 WO HCPCS: Performed by: STUDENT IN AN ORGANIZED HEALTH CARE EDUCATION/TRAINING PROGRAM

## 2025-02-06 PROCEDURE — 94761 N-INVAS EAR/PLS OXIMETRY MLT: CPT

## 2025-02-06 PROCEDURE — 99222 1ST HOSP IP/OBS MODERATE 55: CPT | Performed by: STUDENT IN AN ORGANIZED HEALTH CARE EDUCATION/TRAINING PROGRAM

## 2025-02-06 PROCEDURE — 2700000000 HC OXYGEN THERAPY PER DAY

## 2025-02-06 PROCEDURE — 99223 1ST HOSP IP/OBS HIGH 75: CPT | Performed by: INTERNAL MEDICINE

## 2025-02-06 PROCEDURE — 93970 EXTREMITY STUDY: CPT

## 2025-02-06 PROCEDURE — 93010 ELECTROCARDIOGRAM REPORT: CPT | Performed by: INTERNAL MEDICINE

## 2025-02-06 PROCEDURE — 71260 CT THORAX DX C+: CPT

## 2025-02-06 PROCEDURE — 85610 PROTHROMBIN TIME: CPT

## 2025-02-06 PROCEDURE — 83036 HEMOGLOBIN GLYCOSYLATED A1C: CPT

## 2025-02-06 PROCEDURE — 84484 ASSAY OF TROPONIN QUANT: CPT

## 2025-02-06 PROCEDURE — 2580000003 HC RX 258: Performed by: INTERNAL MEDICINE

## 2025-02-06 PROCEDURE — 6370000000 HC RX 637 (ALT 250 FOR IP): Performed by: INTERNAL MEDICINE

## 2025-02-06 PROCEDURE — 87536 HIV-1 QUANT&REVRSE TRNSCRPJ: CPT

## 2025-02-06 PROCEDURE — 99233 SBSQ HOSP IP/OBS HIGH 50: CPT | Performed by: INTERNAL MEDICINE

## 2025-02-06 PROCEDURE — 6360000004 HC RX CONTRAST MEDICATION: Performed by: STUDENT IN AN ORGANIZED HEALTH CARE EDUCATION/TRAINING PROGRAM

## 2025-02-06 PROCEDURE — 2580000003 HC RX 258: Performed by: STUDENT IN AN ORGANIZED HEALTH CARE EDUCATION/TRAINING PROGRAM

## 2025-02-06 PROCEDURE — 80076 HEPATIC FUNCTION PANEL: CPT

## 2025-02-06 PROCEDURE — 82803 BLOOD GASES ANY COMBINATION: CPT

## 2025-02-06 PROCEDURE — 85025 COMPLETE CBC W/AUTO DIFF WBC: CPT

## 2025-02-06 PROCEDURE — C8929 TTE W OR WO FOL WCON,DOPPLER: HCPCS

## 2025-02-06 PROCEDURE — 87641 MR-STAPH DNA AMP PROBE: CPT

## 2025-02-06 PROCEDURE — 93005 ELECTROCARDIOGRAM TRACING: CPT | Performed by: STUDENT IN AN ORGANIZED HEALTH CARE EDUCATION/TRAINING PROGRAM

## 2025-02-06 PROCEDURE — 0202U NFCT DS 22 TRGT SARS-COV-2: CPT

## 2025-02-06 RX ORDER — ASPIRIN 81 MG/1
81 TABLET ORAL DAILY
Status: DISCONTINUED | OUTPATIENT
Start: 2025-02-06 | End: 2025-02-10 | Stop reason: HOSPADM

## 2025-02-06 RX ORDER — OSELTAMIVIR PHOSPHATE 75 MG/1
75 CAPSULE ORAL 2 TIMES DAILY
Status: DISCONTINUED | OUTPATIENT
Start: 2025-02-06 | End: 2025-02-10 | Stop reason: HOSPADM

## 2025-02-06 RX ORDER — OLANZAPINE 10 MG/2ML
2.5 INJECTION, POWDER, FOR SOLUTION INTRAMUSCULAR
Status: COMPLETED | OUTPATIENT
Start: 2025-02-06 | End: 2025-02-06

## 2025-02-06 RX ORDER — FUROSEMIDE 10 MG/ML
20 INJECTION INTRAMUSCULAR; INTRAVENOUS ONCE
Status: COMPLETED | OUTPATIENT
Start: 2025-02-06 | End: 2025-02-06

## 2025-02-06 RX ORDER — ALBUTEROL SULFATE 0.83 MG/ML
2.5 SOLUTION RESPIRATORY (INHALATION) EVERY 4 HOURS PRN
Status: DISCONTINUED | OUTPATIENT
Start: 2025-02-06 | End: 2025-02-10 | Stop reason: HOSPADM

## 2025-02-06 RX ORDER — IPRATROPIUM BROMIDE AND ALBUTEROL SULFATE 2.5; .5 MG/3ML; MG/3ML
1 SOLUTION RESPIRATORY (INHALATION)
Status: DISCONTINUED | OUTPATIENT
Start: 2025-02-06 | End: 2025-02-07

## 2025-02-06 RX ORDER — SODIUM CHLORIDE, SODIUM LACTATE, POTASSIUM CHLORIDE, CALCIUM CHLORIDE 600; 310; 30; 20 MG/100ML; MG/100ML; MG/100ML; MG/100ML
INJECTION, SOLUTION INTRAVENOUS CONTINUOUS
Status: ACTIVE | OUTPATIENT
Start: 2025-02-06 | End: 2025-02-07

## 2025-02-06 RX ORDER — WATER 10 ML/10ML
INJECTION INTRAMUSCULAR; INTRAVENOUS; SUBCUTANEOUS
Status: COMPLETED
Start: 2025-02-06 | End: 2025-02-06

## 2025-02-06 RX ORDER — IOPAMIDOL 755 MG/ML
85 INJECTION, SOLUTION INTRAVASCULAR
Status: COMPLETED | OUTPATIENT
Start: 2025-02-06 | End: 2025-02-06

## 2025-02-06 RX ORDER — MAGNESIUM SULFATE IN WATER 40 MG/ML
2000 INJECTION, SOLUTION INTRAVENOUS ONCE
Status: COMPLETED | OUTPATIENT
Start: 2025-02-06 | End: 2025-02-06

## 2025-02-06 RX ORDER — EMTRICITABINE AND TENOFOVIR DISOPROXIL FUMARATE 200; 300 MG/1; MG/1
1 TABLET, FILM COATED ORAL DAILY
Status: DISCONTINUED | OUTPATIENT
Start: 2025-02-07 | End: 2025-02-10 | Stop reason: HOSPADM

## 2025-02-06 RX ORDER — LOSARTAN POTASSIUM 25 MG/1
12.5 TABLET ORAL DAILY
Status: DISCONTINUED | OUTPATIENT
Start: 2025-02-06 | End: 2025-02-07

## 2025-02-06 RX ADMIN — HEPARIN SODIUM 4000 UNITS: 1000 INJECTION INTRAVENOUS; SUBCUTANEOUS at 02:12

## 2025-02-06 RX ADMIN — Medication 10 ML: at 02:16

## 2025-02-06 RX ADMIN — VANCOMYCIN HYDROCHLORIDE 750 MG: 750 INJECTION, POWDER, LYOPHILIZED, FOR SOLUTION INTRAVENOUS at 12:47

## 2025-02-06 RX ADMIN — IPRATROPIUM BROMIDE AND ALBUTEROL SULFATE 1 DOSE: 2.5; .5 SOLUTION RESPIRATORY (INHALATION) at 14:47

## 2025-02-06 RX ADMIN — IPRATROPIUM BROMIDE AND ALBUTEROL SULFATE 1 DOSE: 2.5; .5 SOLUTION RESPIRATORY (INHALATION) at 19:35

## 2025-02-06 RX ADMIN — WATER 40 MG: 1 INJECTION INTRAMUSCULAR; INTRAVENOUS; SUBCUTANEOUS at 10:27

## 2025-02-06 RX ADMIN — MAGNESIUM SULFATE HEPTAHYDRATE 2000 MG: 2 INJECTION, SOLUTION INTRAVENOUS at 13:49

## 2025-02-06 RX ADMIN — PANTOPRAZOLE SODIUM 40 MG: 40 INJECTION, POWDER, LYOPHILIZED, FOR SOLUTION INTRAVENOUS at 12:05

## 2025-02-06 RX ADMIN — CEFEPIME 2000 MG: 2 INJECTION, POWDER, FOR SOLUTION INTRAVENOUS at 08:37

## 2025-02-06 RX ADMIN — IOPAMIDOL 85 ML: 755 INJECTION, SOLUTION INTRAVENOUS at 00:45

## 2025-02-06 RX ADMIN — PERFLUTREN 1.5 ML: 6.52 INJECTION, SUSPENSION INTRAVENOUS at 11:40

## 2025-02-06 RX ADMIN — FUROSEMIDE 20 MG: 10 INJECTION, SOLUTION INTRAMUSCULAR; INTRAVENOUS at 11:21

## 2025-02-06 RX ADMIN — IPRATROPIUM BROMIDE AND ALBUTEROL SULFATE 1 DOSE: 2.5; .5 SOLUTION RESPIRATORY (INHALATION) at 05:58

## 2025-02-06 RX ADMIN — OSELTAMIVIR PHOSPHATE 75 MG: 75 CAPSULE ORAL at 20:15

## 2025-02-06 RX ADMIN — WATER 2.1 ML: 1 INJECTION INTRAMUSCULAR; INTRAVENOUS; SUBCUTANEOUS at 17:23

## 2025-02-06 RX ADMIN — ASPIRIN 81 MG: 81 TABLET, COATED ORAL at 10:15

## 2025-02-06 RX ADMIN — ACETAMINOPHEN 650 MG: 325 TABLET ORAL at 03:08

## 2025-02-06 RX ADMIN — IPRATROPIUM BROMIDE AND ALBUTEROL SULFATE 1 DOSE: 2.5; .5 SOLUTION RESPIRATORY (INHALATION) at 11:41

## 2025-02-06 RX ADMIN — Medication 10 ML: at 08:38

## 2025-02-06 RX ADMIN — OLANZAPINE 2.5 MG: 10 INJECTION, POWDER, LYOPHILIZED, FOR SOLUTION INTRAMUSCULAR at 16:38

## 2025-02-06 RX ADMIN — HEPARIN SODIUM 12 UNITS/KG/HR: 10000 INJECTION, SOLUTION INTRAVENOUS at 02:16

## 2025-02-06 RX ADMIN — LOSARTAN POTASSIUM 12.5 MG: 25 TABLET, FILM COATED ORAL at 11:20

## 2025-02-06 RX ADMIN — SODIUM CHLORIDE, POTASSIUM CHLORIDE, SODIUM LACTATE AND CALCIUM CHLORIDE: 600; 310; 30; 20 INJECTION, SOLUTION INTRAVENOUS at 13:47

## 2025-02-06 RX ADMIN — SODIUM CHLORIDE, POTASSIUM CHLORIDE, SODIUM LACTATE AND CALCIUM CHLORIDE: 600; 310; 30; 20 INJECTION, SOLUTION INTRAVENOUS at 01:08

## 2025-02-06 NOTE — H&P
Hospital Medicine History & Physical      Date of Admission: 2/5/2025    Date of Service:  Pt seen/examined on 2/5/2025    [x]Admitted to Inpatient with expected LOS greater than two midnights due to medical therapy.  []Placed in Observation status.    Chief Admission Complaint: Presenting as unwitnessed fall from his ECF.    Presenting Admission History:      79 y.o. male who presented to [facility] with an unwitnessed fall from his ECF after losing balance while ambulating with his walker.  PMHx significant for HIV, schizoaffective disorder, dementia, GERD, hypertension, history of seizures.      Patient seen at bedside today.  He denies any chest pain but did note worsening shortness of breath and coughing that started 3 to 4 days ago.  Denied any fevers or chills.  Denies any new rashes, sores.  Does live at a ECF but unaware of any sick contacts.  No other acute complaints.  Says his cough is nonproductive.    In the ED, patient noted to have prolonged QTc in the 600s.  He was given IV magnesium and subsequent testing improved to the 500s.    In ED, heart rate of 110-1 20s and febrile to 100.5, blood pressure stable.  VBG unremarkable.  BMP unremarkable except for magnesium 1.60, lactic acid of 2.6.  Troponin elevated at 195 with repeat pending.  CBC showing white blood cell count 11.4, hemoglobin of 13.6.  UA unremarkable.  Flu, COVID, RSV negative.  Chest x-ray no acute process.    Patient be admitted for sepsis of unknown known etiology.  CT head unremarkable.  CT cervical spine also unremarkable.    Assessment/Plan:      Current Principal Problem:  Sepsis (HCC)    #Sepsis of unclear etiology: Patient with respiratory symptoms, could be related to viral etiology  #Acute hypoxic respiratory failure secondary to viral infection versus pneumonia  #Lactic acidosis  -Check CTA chest to rule out acute PE given tachycardia, fever, hypoxia and elevated troponins and evaluate for infectious process that may have been

## 2025-02-06 NOTE — CONSULTS
Pharmacy to Manage Heparin Infusion per Hospital Nomogram    Dx: ACS  Pt wt = 79.4 kg (actual weight)  Baseline aPTT = Pending    Heparin low dose weight based infusion is ordered for patient. Per chart review, patient has not received an oral Factor Xa inhibitor within the last 72 hours. As oral Factor Xa inhibitors can impact the anti-Xa test calibrated to unfractionated heparin, Heparin infusion will be monitored and adjusted using anti-Xa per [facility].    Heparin (weight-based) Infusion: CAD/STEMI/NSTEMI/AFIB  Heparin 60units/kg IVP bolus (max 4000 units) followed by Heparin infusion at 12 units/kg/hr (recommended max initial rate: 1000units/hr).  Check  anti-Xa  in 6 hours.   anti-Xa < 0.1            Heparin 60 units/kg bolus (max 4,000 units)    Increase infusion by 4 units/kg/hr  anti-Xa 0.1-0.29       Heparin 30 units/kg bolus (max 2,000 units)    Increase infusion by 2 units/kg/hr  anti-Xa 0.3-0.7         No bolus                                                           No change   anti-Xa 0.71-0.8       No bolus                                                           Decrease infusion by 1 units/kg/hr  anti-Xa 0.81-0.99     No bolus                                                           Decrease infusion by 2 units/kg/hr  anti-Xa 1 or more     Hold heparin for 1 hour                                    Decrease infusion by 3 units/kg/hr    When Anti-Xa  is within target range for two consecutive times, check Anti-Xa once daily with morning labs.    Kike Young, PharmD  2/6/2025 12:04 AM

## 2025-02-06 NOTE — CONSULTS
Pharmacy Note  Vancomycin Consult    Jeremie Gloria is a 79 y.o. male started on Vancomycin for Sepsis; consult received from Dr. Enamorado to manage therapy. Also receiving the following antibiotics: Cefepime.    Allergies:  Haloperidol, Valproic acid, and Ziprasidone     Tmax:     Micro:     Recent Labs     02/05/25 1944   CREATININE 1.1       Recent Labs     02/05/25 1944   WBC 11.4*       Estimated Creatinine Clearance: 54 mL/min (based on SCr of 1.1 mg/dL).      Intake/Output Summary (Last 24 hours) at 2/6/2025 0154  Last data filed at 2/6/2025 0013  Gross per 24 hour   Intake 298.79 ml   Output --   Net 298.79 ml       Wt Readings from Last 1 Encounters:   02/05/25 79.4 kg (175 lb)         Body mass index is 26.61 kg/m².    Loading dose (critically ill or in ICU, require dialysis or renal replacement therapy): Vancomycin 25 mg/kg IVPB x 1 (maximum 2500 mg).  Maintenance dose: 10-20 mg/kg (maximum: 2000 mg/dose and 4500 mg/day) starting at the next dosing interval determined by renal function  Goal Vancomycin trough: 15-20 mcg/mL   Goal Vancomycin AUC: 400-600     Assessment/Plan:  Will initiate Vancomycin with a one time loading dose of 1500 mg x1, followed by 750 mg IV every 12 hours. Calculated Vancomycin AUC = 556 mg/L*h with an estimated steady-state vancomycin trough = 17.9 mcg/mL. Vancomycin level ordered for 2/6 at 2200. Timing of trough level will be determined based on culture results, renal function, and clinical response.     Thank you for the consult.  Kike Young, LevyD  2/6/2025 1:54 AM

## 2025-02-06 NOTE — CONSULTS
Infectious Diseases   Consult Note      Reason for Consult:  acute febrile illness, HIV+   Requesting Physician:  Kelsea      Date of Admission: 2/5/2025    Subjective:   CHIEF COMPLAINT:  none given       HPI:  Jeremie Gloria is a 79yoM with history of schizoaffective disorder, gout, dementia, GERD, OA, HTN, seizures      He is in long term care    He was diagnosed with HIV in 1998  Current ART is Truvada and dolutegravir  At last check in 10/2024, the CD4 was 606  Last HIV available in 6/2023 was 92c/ml.      ED 2/5/25 - high grade fever and unwitnessed fall   He was hypoxemic on arrival   WBC was 11.4, lactic 2.6  LFTs wnl   UA negative   Flu and COVID tests were negative   CXR was clear  CT head negative   CTPA with PE  +NSTEMI  He was admitted for management of sepsis, possible pna, started on vanc and cefepime.  ID is consulted for e/m.                Fever to 102.7 overnight, currently AF   On 6L NC     Patient endorses dry cough, sore throat.  No abd pain, dysuria, joint pain, rash, itching.       Current abx:  Cefepime 2g q12  Vanc 750 q12        Past Surgical History:       Diagnosis Date    Depression     Diabetes mellitus (HCC)     Erectile dysfunction     High triglycerides     HIV (human immunodeficiency virus infection) (HCC)     Hyperlipidemia     Hypertension     Idiopathic gout     Intermittent confusion     Muscle weakness     Osteoarthritis     Other specified personal history presenting hazards to health(V15.89)     5/08 1/09 psychiatric hospitalizations    Schizophrenia (HCC)     Seizures (HCC)     Vitamin D deficiency          Procedure Laterality Date    EYE SURGERY Right 10/7/2019    LOWER LID ENTROPION REPAIR performed by Maya Ramos MD at [facility]    HEMORRHOID SURGERY           Social History:    TOBACCO:   reports that he has been smoking. He uses smokeless tobacco.  ETOH:   reports no history of alcohol use.  There is no history of illicit drug use or other significant

## 2025-02-06 NOTE — ED PROVIDER NOTES
Emergency Department Attending Provider Note  Location: [facility] ED      Jeremie Gloria was evaluated in the Emergency Department. Although initial history and physical exam information was obtained by Dillon Murrell (who also dictated a record of this visit), I personally saw the patient and made/approved the management plan and take responsibility for the patient management.     Patient seen and evaluated.  Relevant records reviewed. Chronic medical conditions reviewed.    HPI: 79-year-old male presents as an unwitnessed fall from his ECF.  Patient states he lost his balance with ambulating with his walker.  Patient was also found to be febrile and tachycardic.  Patient's denying any pain from the fall.     Physical Exam: On exam patient was tachycardic, febrile, tachypneic.  He was hypoxic requiring oxygen.  Mild respiratory distress.  Speaking full sentences.  Abdomen soft nondistended.     I independently interpreted the following diagnostic test and studies  which show  Labs Reviewed   CBC WITH AUTO DIFFERENTIAL - Abnormal; Notable for the following components:       Result Value    WBC 11.4 (*)     RBC 3.98 (*)     Hematocrit 39.3 (*)     MCH 34.3 (*)     Neutrophils Absolute 10.3 (*)     Lymphocytes Absolute 0.4 (*)     All other components within normal limits   COMPREHENSIVE METABOLIC PANEL W/ REFLEX TO MG FOR LOW K - Abnormal; Notable for the following components:    Glucose 126 (*)     All other components within normal limits   LACTATE, SEPSIS - Abnormal; Notable for the following components:    Lactic Acid, Sepsis 2.6 (*)     All other components within normal limits   BLOOD GAS, VENOUS - Abnormal; Notable for the following components:    pCO2, Justin 35.9 (*)     PO2, Justin 70.2 (*)     HCO3, Venous 22.7 (*)     Carboxyhemoglobin 3.5 (*)     All other components within normal limits   COVID-19, RAPID   RAPID INFLUENZA A/B ANTIGENS   RSV DETECTION   CULTURE, BLOOD 1   CULTURE, BLOOD 2

## 2025-02-06 NOTE — CONSULTS
Pharmacy to dose IV Vanco- Sepsis x1 in ED  Please give 1,500mg IV x1 in ED to provide Gram+ organism coverage to provide Gram+ organism coverage to include MRSA.  Arjun Harris RPH PharmD 2/5/2025 9:49 PM

## 2025-02-06 NOTE — PROGRESS NOTES
Pharmacy: Heparin Drip  Current rate: 12 units/kg/hr  Anti-Xa@ 10:32= 0.42  Goal Anti-Xa= 0.3-0.7  Per protocol, continue with same rate and recheck anti-xa at 0600 tomorrow and daily, due to two consecutive goal Anti-Xa   Maeve Hurd Pharm D 2/6/202511:13 AM  .

## 2025-02-06 NOTE — PLAN OF CARE
Discussed with radiology on-call who noted patient with nonobstructing pulmonary embolism  On heparin drip  Check duplex of bilateral lower extremities

## 2025-02-06 NOTE — CONSULTS
CARDIOLOGY CONSULTATION        Patient Name: Jeremie Gloria  Date of admission: 2/5/2025  6:52 PM  Admission Dx: Septicemia (HCC) [A41.9]  Prolonged QT interval [R94.31]  NSTEMI (non-ST elevated myocardial infarction) (HCC) [I21.4]  Closed head injury, initial encounter [S09.90XA]  Sepsis (HCC) [A41.9]  Requesting Physician: Fabián Enamorado MD  Primary Care physician: Natalie Le MD    Reason for Consultation/Chief Complaint: \"NSTEMI\"    History of Present Illness:     Jeremie Gloria is a 79 y.o. patient with past medical history of HIV, dementia, schizoaffective disorder, GERD, hypertension, history of seizures who presented to the hospital with complaints of unwitnessed fall at his extended care facility.  Patient reportedly lost his balance while ambulating with his walker.  Patient reports shortness of breath.  Reports wheezing and cough.  Denies any chest pain.  Denies lower extremity edema.  Denies palpitations.    Presenting EKG sinus tachycardia with a rate of 112 bpm, possible right atrial enlargement, inferior infarct cited on or before October 6, 2023, possible anterolateral infarct cited on or before October 6, 2023, nonspecific ST abnormalities, prolonged QTc 647 ms.  Repeat ECG significant artifact but overall similar aside from faster rates with sinus tachycardia 127 bpm.  ECG today sinus tachycardia 115 bpm, QTc 630 ms nonspecific ST abnormalities.  Otherwise similar to priors.    CT head and CT C-spine without acute abnormalities.    Past Medical History:   has a past medical history of Depression, Diabetes mellitus ([facility]), Erectile dysfunction, High triglycerides, HIV (human immunodeficiency virus infection) ([facility]), Hyperlipidemia, Hypertension, Idiopathic gout, Intermittent confusion, Muscle weakness, Osteoarthritis, Other specified personal history presenting hazards to health(V15.89), Schizophrenia ([facility]), Seizures ([facility]), and Vitamin D deficiency.    Surgical History:   has a past  an acute PE.  Additionally patient appears infected.  All of these would contribute to elevated troponin.  EKG with sinus tachycardia, evidence of old inferior infarct, anterolateral infarct.  Nonspecific ST abnormalities.  No obvious ischemic changes.  Of note, patient denies any chest pain  -continue hep gtt; NSTEMI/PE  -IV Lasix 20 mg x 1.  Monitor blood pressure.  -Start 81 mg aspirin  -Start statin when LFTs stabilize  -Start low-dose losartan for afterload reduction  -Trend troponin until peak  -Antibiotics per primary/infectious disease  -Avoid QT prolonging medications.  -Recommend magnesium goal of 2.0.  Recommend potassium goal 4.0  -Will follow.  Patient will have to stabilize medically and will need ischemic evaluation, likely with  coronary angiography. Timing pending hospital course.     Patient Active Problem List   Diagnosis    History of other specified conditions presenting hazards to health    Depression    Schizophrenia (HCC)    Seizures (HCC)    Hyperlipidemia    ED (erectile dysfunction)    HIV (human immunodeficiency virus infection) (HCC)    Gout    HTN (hypertension)    Schizoaffective disorder (HCC)    Psychosis (HCC)    Bipolar affective disorder, mixed, severe (HCC)    Cognitive disorder    Dementia (HCC)    Human immunodeficiency virus (HIV) infection (HCC)    Hypokalemia    Hyperammonemia (HCC)    Altered mental status    Paranoid schizophrenia (HCC)    Encounter for general medical examination    Tobacco use    Sepsis (HCC)         I will address the patient's cardiac risk factors and adjusted pharmacologic treatment as needed. In addition, I have reinforced the need for patient directed risk factor modification.  All questions and concerns were addressed to the patient/family. Alternatives to my treatment were discussed.     Thank you for allowing us to participate in the care of Jeremie Gloria. Please call me with any questions [phone].    Chuy Durán, DO   Cardiovascular

## 2025-02-06 NOTE — ED NOTES
Patient denies feeling SOB at this time, denies chest pain. ED RN believes patient continues to display increased work of breathing without acute distress, admitting physician Kelsea IVAN aware. Patient has eyes closed, resting comfortably, denies needs at this time.

## 2025-02-06 NOTE — CONSULTS
Pulmonary & Critical Care Consultation Note    CC: PE    HISTORY OF PRESENT ILLNESS: 79-year-old male with a history of dementia presented with a fall while ambulating at his nursing home.  In the emergency room the patient complained of shortness of breath and a cough for the last several days.  He had a CTPA which showed subocclusive pulmonary emboli.  Today on my exam he is confused and not to get her story.  He denies shortness of breath.  Was noted by RT and his nurse that he had significant respiratory distress earlier today that has now apparently improved.    PAST MEDICAL HISTORY:  Past Medical History:   Diagnosis Date    Depression     Diabetes mellitus (HCC)     Erectile dysfunction     High triglycerides     HIV (human immunodeficiency virus infection) (HCC)     Hyperlipidemia     Hypertension     Idiopathic gout     Intermittent confusion     Muscle weakness     Osteoarthritis     Other specified personal history presenting hazards to health(V15.89)     5/08 1/09 psychiatric hospitalizations    Schizophrenia (HCC)     Seizures ([facility])     Vitamin D deficiency      PAST SURGICAL HISTORY:  Past Surgical History:   Procedure Laterality Date    EYE SURGERY Right 10/7/2019    LOWER LID ENTROPION REPAIR performed by Maya Ramos MD at [facility]    HEMORRHOID SURGERY         FAMILY HISTORY:  family history is not on file.    SOCIAL HISTORY:   reports that he has been smoking. He uses smokeless tobacco.    Scheduled Meds:   cefepime  2,000 mg IntraVENous Q12H    ipratropium 0.5 mg-albuterol 2.5 mg  1 Dose Inhalation 4x Daily RT    vancomycin  750 mg IntraVENous Q12H    aspirin  81 mg Oral Daily    methylPREDNISolone  40 mg IntraVENous Daily    losartan  12.5 mg Oral Daily    pantoprazole (PROTONIX) 40 mg in sodium chloride (PF) 0.9 % 10 mL injection  40 mg IntraVENous Daily    sterile water        sodium chloride flush  5-40 mL IntraVENous 2 times per day     Continuous Infusions:   lactated ringers

## 2025-02-06 NOTE — PROGRESS NOTES
Perfect serve sent to Karolina Mary, Infectious Disease, for routine consult. Fabiola Hilario RN

## 2025-02-06 NOTE — PROGRESS NOTES
PCA notified writer that patient removed IV and tele.   Patient agitated- yelling at nurse \"I'm done, you're not sticking me or putting anything in me, I'm perfectly healthy.  Spoke with MD. Zyprexa IM ordered.   Will replace IV when patient is more calm.      IV replaced.  IM zyprexa given

## 2025-02-06 NOTE — ED NOTES
Patient pulled up in bed, repositioned. Patient does report feeling SOB at this time. Patient's oxygen saturation 91% on 4L NC, patient titrated to 5L NC. ED RN remains at bedside. ED RN sent perfect serve message to Kelsea IVAN inquiring about repeat VBG, additional breathing treatments and/or IV steroids, also inquired about obtaining BNP as specimen in lab. Waiting for response and/or new orders.

## 2025-02-06 NOTE — PROGRESS NOTES
Patient resting in bed at this time. Denies any needs.   States \"I am healthy and don't need this shit in my body, you're trying to kill me\"

## 2025-02-06 NOTE — ED NOTES
Karin Sidhu stated pt 80% on monitor, this RN to bedside. Pt 78% on RA and wheezing. Pt placed on 2L NC, pt oxygen came up to 84%. Pt increased to 4L NC, pt now 93% Dillon BAINS notified.

## 2025-02-06 NOTE — PROGRESS NOTES
RT Inhaler-Nebulizer Bronchodilator Protocol Note    There is a bronchodilator order in the chart from a provider indicating to follow the RT Bronchodilator Protocol and there is an “Initiate RT Inhaler-Nebulizer Bronchodilator Protocol” order as well (see protocol at bottom of note).    CXR Findings:  XR CHEST PORTABLE    Result Date: 2/5/2025  No acute cardiopulmonary findings.       The findings from the last RT Protocol Assessment were as follows:   History Pulmonary Disease: Smoker 15 pack years or more  Respiratory Pattern: Dyspnea on exertion or RR 21-25 bpm  Breath Sounds: Inspiratory and expiratory or bilateral wheezing and/or rhonchi  Cough: Weak, non-productive  Indication for Bronchodilator Therapy: Wheezing associated with pulm disorder  Bronchodilator Assessment Score: 12    Aerosolized bronchodilator medication orders have been revised according to the RT Inhaler-Nebulizer Bronchodilator Protocol below.    Respiratory Therapist to perform RT Therapy Protocol Assessment initially then follow the protocol.  Repeat RT Therapy Protocol Assessment PRN for score 0-3 or on second treatment, BID, and PRN for scores above 3.    No Indications - adjust the frequency to every 6 hours PRN wheezing or bronchospasm, if no treatments needed after 48 hours then discontinue using Per Protocol order mode.     If indication present, adjust the RT bronchodilator orders based on the Bronchodilator Assessment Score as indicated below.  Use Inhaler orders unless patient has one or more of the following: on home nebulizer, not able to hold breath for 10 seconds, is not alert and oriented, cannot activate and use MDI correctly, or respiratory rate 25 breaths per minute or more, then use the equivalent nebulizer order(s) with same Frequency and PRN reasons based on the score.  If a patient is on this medication at home then do not decrease Frequency below that used at home.    0-3 - enter or revise RT bronchodilator order(s)

## 2025-02-06 NOTE — PROGRESS NOTES
Patient admitted to room 308-1 from ED. Patient oriented to room, call light, bed rails, phone, lights and bathroom. Patient instructed about the schedule of the day including: vital sign frequency, lab draws, possible tests, frequency of MD and staff rounds, daily weights, I &O's and prescribed diet. Telemetry box in place, patient aware of placement and reason. Bed locked, in lowest position, side rails up 2/4, call light within reach.        Recliner Assessment  Patient is not able to demonstrated the ability to move from a reclining position to an upright position within the recliner. however patient is alert, oriented and able to provide informed consent       4 Eyes Skin Assessment     NAME:  Jeremie Gloria  YOB: 1945  MEDICAL RECORD NUMBER:  [id number]    The patient is being assessed for  Admission    I agree that at least one RN has performed a thorough Head to Toe Skin Assessment on the patient. ALL assessment sites listed below have been assessed.      Areas assessed by both nurses:    Head, Face, Ears, Shoulders, Back, Chest, Arms, Elbows, Hands, Sacrum. Buttock, Coccyx, Ischium, and Legs. Feet and Heels        Does the Patient have a Wound? No noted wound(s)       Nicholas Prevention initiated by RN: No  Wound Care Orders initiated by RN: No    Pressure Injury (Stage 3,4, Unstageable, DTI, NWPT, and Complex wounds) if present, place Wound referral order by RN under : No    New Ostomies, if present place, Ostomy referral order under : No     Nurse 1 eSignature: Electronically signed by Fabiola Hilario RN on 2/6/25 at 2:59 AM EST    **SHARE this note so that the co-signing nurse can place an eSignature**    Nurse 2 eSignature: {Esignature:[id number]}

## 2025-02-06 NOTE — PROGRESS NOTES
2/6  Anti-Xa = 0.48 at 0504.  Was drawn a bit early.  Continue heparin gtt at 12 units/kg/hr.  Recheck Anti-Xa in 6 hours.  Kike Young, PharmMOLLY  2/6/2025 6:06 AM

## 2025-02-06 NOTE — PROGRESS NOTES
RT Inhaler-Nebulizer Bronchodilator Protocol Note    There is a bronchodilator order in the chart from a provider indicating to follow the RT Bronchodilator Protocol and there is an “Initiate RT Inhaler-Nebulizer Bronchodilator Protocol” order as well (see protocol at bottom of note).    CXR Findings:  XR CHEST PORTABLE    Result Date: 2/5/2025  No acute cardiopulmonary findings.       The findings from the last RT Protocol Assessment were as follows:   History Pulmonary Disease: None or smoker <15 pack years  Respiratory Pattern: Mild dyspnea at rest, irregular pattern, or RR 21-25 bpm  Breath Sounds: Intermittent or unilateral wheezes  Cough: Weak, non-productive  Indication for Bronchodilator Therapy:    Bronchodilator Assessment Score: 11    Aerosolized bronchodilator medication orders have been revised according to the RT Inhaler-Nebulizer Bronchodilator Protocol below.    Respiratory Therapist to perform RT Therapy Protocol Assessment initially then follow the protocol.  Repeat RT Therapy Protocol Assessment PRN for score 0-3 or on second treatment, BID, and PRN for scores above 3.    No Indications - adjust the frequency to every 6 hours PRN wheezing or bronchospasm, if no treatments needed after 48 hours then discontinue using Per Protocol order mode.     If indication present, adjust the RT bronchodilator orders based on the Bronchodilator Assessment Score as indicated below.  Use Inhaler orders unless patient has one or more of the following: on home nebulizer, not able to hold breath for 10 seconds, is not alert and oriented, cannot activate and use MDI correctly, or respiratory rate 25 breaths per minute or more, then use the equivalent nebulizer order(s) with same Frequency and PRN reasons based on the score.  If a patient is on this medication at home then do not decrease Frequency below that used at home.    0-3 - enter or revise RT bronchodilator order(s) to equivalent RT Bronchodilator order with

## 2025-02-06 NOTE — PROGRESS NOTES
Completed Specimen: Nasopharyngeal Updated: 02/05/25 2017     Rapid Influenza A Ag Negative     Rapid Influenza B Ag Negative    RSV Detection [[id number]] Collected: 02/05/25 1944    Order Status: Completed Specimen: Nasopharyngeal Swab Updated: 02/05/25 2108     RSV Rapid Ag Negative            RADIOLOGY    CT CHEST PULMONARY EMBOLISM W CONTRAST   Final Result   Nonobstructing pulmonary emboli are present in a branch to the posteromedial   aspect of the right lower lobe inferiorly. Nonobstructing pulmonary embolus   is also present in the main pulmonary artery.      Critical results were called by Dr. Walker Narvaez to Fabián Enamorado MD on   2/6/2025 at 0333.         XR CHEST PORTABLE   Final Result   No acute cardiopulmonary findings.         CT CERVICAL SPINE WO CONTRAST   Final Result   1. No acute intracranial abnormality.   2. No acute osseous abnormality of the cervical spine. Multilevel   degenerative disc disease and facet arthrosis.         CT HEAD WO CONTRAST   Final Result   1. No acute intracranial abnormality.   2. No acute osseous abnormality of the cervical spine. Multilevel   degenerative disc disease and facet arthrosis.         Vascular duplex lower extremity venous bilateral    (Results Pending)          Assessment/Plan:      Sepsis  - Source: unknown, possible viral   - Criteria: (+) fever, (+) HR, (+) RR, (+) WBC, (+) LA  - Admit to PCU  - Blood & Resp cx   - respiratory panel ordered since he had negative rapid testing in the ER which could be inaccurate. Tested positive for flu.  - monitor vitals and labs  - started on Vancomycin and Cefepime at this time  - ID consulted   - mgmt as below    - started on Tamiflu    Acute hypoxic respiratory failure secondary to viral infection versus pneumonia vs PE  -Check CTA chest to rule out acute PE given tachycardia, fever, hypoxia and elevated troponins and evaluate for infectious process that may have been missed on x-ray  -Start IV fluids  -Check sputum  cultures, pneumonia PCR panel, strep and Legionella urine antigen  -Blood cultures ordered  -Start empiric vancomycin and cefepime  -Infectious disease consulted  -Check HIV RNA quant, CD4 count    Acute PE  - CT as above  - started on heparin gtt  - pulmonary consulted  - echo ordered and pending      NSTEMI  Prolonged Qtc  -Started heparin drip  - troponin 274--602--776  - Cardiology consult  - start aspirin  - started on Losartan   - Telemetry monitoring     Hypomagnesemia  - 1.6--1.89  -Replace magnesium as needed    HIV  - On HIV medications prior to admission - re-order unsure if on formulary  - ID consulted, appreciate recommendations      Schizoaffective disorder  - on Invega monthly      History of seizures  - monitor        Note above makes patient higher risk for morbidity and mortality requiring testing and treatment.      DVT Prophylaxis: heparin gtt  Diet: Diet NPO  Code Status: Full Code    Iva Patten DO

## 2025-02-06 NOTE — CARE COORDINATION
Case Management Assessment  Initial Evaluation    Date/Time of Evaluation: 2/6/2025 9:59 AM  Assessment Completed by: LYNETTE Bowman    If patient is discharged prior to next notation, then this note serves as note for discharge by case management.    Patient Name: Jeremie Gloria                   YOB: 1945  Diagnosis: Septicemia (HCC) [A41.9]  Prolonged QT interval [R94.31]  NSTEMI (non-ST elevated myocardial infarction) (HCC) [I21.4]  Closed head injury, initial encounter [S09.90XA]  Sepsis (HCC) [A41.9]                   Date / Time: 2/5/2025  6:52 PM    Patient Admission Status: Inpatient   Readmission Risk (Low < 19, Mod (19-27), High > 27): Readmission Risk Score: 11.3    Current PCP: Natalie Le MD  PCP verified by CM? Yes    Chart Reviewed: Yes      History Provided by: Child/Family, Medical Record  Patient Orientation: Other (see comment) (Disoriented)    Patient Cognition: Alert    Hospitalization in the last 30 days (Readmission):  No    If yes, Readmission Assessment in CM Navigator will be completed.    Advance Directives:      Code Status: Full Code   Patient's Primary Decision Maker is: Named in Scanned ACP Document      Discharge Planning:    Patient lives with: Other (Comment) ([facility] residents) Type of Home: Long-Term Care  Primary Care Giver: Other (Comment) ([facility] staff)  Patient Support Systems include: Family Members   Current Financial resources: Medicaid, Medicare  Current community resources: ECF/Home Care ([facility])  Current services prior to admission: Durable Medical Equipment, Extended Care Facility            Current DME: Walker            Type of Home Care services:  None    ADLS  Prior functional level: Assistance with the following:, Bathing, Dressing, Toileting, Cooking, Housework, Shopping, Mobility  Current functional level: Assistance with the following:, Bathing, Dressing, Toileting, Cooking, Housework, Shopping,  called and spoke with Shyam @ [facility]. Shyam stated pt is a bed hold and can return with no barriers.       The Plan for Transition of Care is related to the following treatment goals of Septicemia (HCC) [A41.9]  Prolonged QT interval [R94.31]  NSTEMI (non-ST elevated myocardial infarction) (HCC) [I21.4]  Closed head injury, initial encounter [S09.90XA]  Sepsis (HCC) [A41.9]    IF APPLICABLE: The Patient and/or patient representative Jeremie and his family were provided with a choice of provider and agrees with the discharge plan. Freedom of choice list with basic dialogue that supports the patient's individualized plan of care/goals and shares the quality data associated with the providers was provided to:     Patient Representative Name:       The Patient and/or Patient Representative Agree with the Discharge Plan?      Levi Leon, MSW  Case Management Department  [phone]

## 2025-02-06 NOTE — ED PROVIDER NOTES
[facility]  EMERGENCY DEPARTMENT ENCOUNTER        Pt Name: Jeremie Gloria  MRN: [id number]  Birthdate 1945  Date of evaluation: 2/5/2025  Provider: EDUARDA James - ALEJANDRA  PCP: Natalie Le MD  Note Started: 10:09 PM EST 2/5/25       I have seen and evaluated this patient with my supervising physician Iva Patten DO.      CHIEF COMPLAINT       Chief Complaint   Patient presents with    Fall     Unwitnessed fall at 1600, also noticed to have high temp at nursing home. As well as being tachycardic. Last dose of tylenol at 1700 per EMS. Patient alert and oriented x2/3 which is baseline. EMS reports patient has been more unsteady on feet as well since fall        HISTORY OF PRESENT ILLNESS: 1 or more Elements     History From: Patient, Nursing home     Limitations to history : None    Social Determinants Significantly Affecting Health : None    Chief Complaint: Fever, fall     Jeremie Gloria is a 79 y.o. male who presents to the emergency department day with symptoms of fever.  Patient has not felt well for the last 24 to 48 hours.  Also has been feeling of general weakness and had a fall today.  Patient denies hitting his head.  States that he denies any injury from this fall.  Patient upon arrival had fever of 100.5.  He denies any abdominal discomfort.  Has reported mild intermittent cough.  Denies any other acute concerns at this time.    Nursing Notes were all reviewed and agreed with or any disagreements were addressed in the HPI.    REVIEW OF SYSTEMS :      Review of Systems    Positives and Pertinent negatives as per HPI.     SURGICAL HISTORY     Past Surgical History:   Procedure Laterality Date    EYE SURGERY Right 10/7/2019    LOWER LID ENTROPION REPAIR performed by Maya Ramos MD at [facility]    HEMORRHOID SURGERY         CURRENTMEDICATIONS       Current Discharge Medication List        CONTINUE these medications which have NOT CHANGED    Details   vitamin D  ([facility])    2. Closed head injury, initial encounter    3. Prolonged QT interval    4. NSTEMI (non-ST elevated myocardial infarction) ([facility])    5. Acute pulmonary embolism without acute cor pulmonale, unspecified pulmonary embolism type ([facility])          DISPOSITION/PLAN     DISPOSITION Admitted 02/05/2025 11:05:44 PM               PATIENT REFERRED TO:  No follow-up provider specified.    DISCHARGE MEDICATIONS:  Current Discharge Medication List          DISCONTINUED MEDICATIONS:  Current Discharge Medication List                 (Please note that portions of this note were completed with a voice recognition program.  Efforts were made to edit the dictations but occasionally words are mis-transcribed.)    EDUARDA James CNP (electronically signed)      Dillon Murrell APRN - CNP  02/07/25 [id number]

## 2025-02-07 PROBLEM — R50.9 ACUTE FEBRILE ILLNESS: Status: ACTIVE | Noted: 2025-02-07

## 2025-02-07 LAB
ALBUMIN SERPL-MCNC: 3.5 G/DL (ref 3.4–5)
ALP SERPL-CCNC: 49 U/L (ref 40–129)
ALT SERPL-CCNC: 21 U/L (ref 10–40)
ANION GAP SERPL CALCULATED.3IONS-SCNC: 12 MMOL/L (ref 3–16)
ANTI-XA UNFRAC HEPARIN: 0.29 IU/ML (ref 0.3–0.7)
ANTI-XA UNFRAC HEPARIN: 0.7 IU/ML (ref 0.3–0.7)
ANTI-XA UNFRAC HEPARIN: 0.98 IU/ML (ref 0.3–0.7)
AST SERPL-CCNC: 127 U/L (ref 15–37)
BASOPHILS # BLD: 0 K/UL (ref 0–0.2)
BASOPHILS NFR BLD: 0.1 %
BILIRUB DIRECT SERPL-MCNC: 0.2 MG/DL (ref 0–0.3)
BILIRUB INDIRECT SERPL-MCNC: 0.3 MG/DL (ref 0–1)
BILIRUB SERPL-MCNC: 0.5 MG/DL (ref 0–1)
BUN SERPL-MCNC: 15 MG/DL (ref 7–20)
CALCIUM SERPL-MCNC: 8.7 MG/DL (ref 8.3–10.6)
CHLORIDE SERPL-SCNC: 107 MMOL/L (ref 99–110)
CO2 SERPL-SCNC: 23 MMOL/L (ref 21–32)
CREAT SERPL-MCNC: 0.9 MG/DL (ref 0.8–1.3)
DEPRECATED RDW RBC AUTO: 13.5 % (ref 12.4–15.4)
EOSINOPHIL # BLD: 0 K/UL (ref 0–0.6)
EOSINOPHIL NFR BLD: 0.1 %
EST. AVERAGE GLUCOSE BLD GHB EST-MCNC: 79.6 MG/DL
GFR SERPLBLD CREATININE-BSD FMLA CKD-EPI: 88 ML/MIN/{1.73_M2}
GLUCOSE SERPL-MCNC: 92 MG/DL (ref 70–99)
HBA1C MFR BLD: 4.4 %
HCT VFR BLD AUTO: 37.5 % (ref 40.5–52.5)
HGB BLD-MCNC: 12.9 G/DL (ref 13.5–17.5)
LEGIONELLA AG UR QL: NORMAL
LYMPHOCYTES # BLD: 1.4 K/UL (ref 1–5.1)
LYMPHOCYTES NFR BLD: 15.2 %
MCH RBC QN AUTO: 34.9 PG (ref 26–34)
MCHC RBC AUTO-ENTMCNC: 34.5 G/DL (ref 31–36)
MCV RBC AUTO: 101.2 FL (ref 80–100)
MONOCYTES # BLD: 1 K/UL (ref 0–1.3)
MONOCYTES NFR BLD: 11.4 %
MRSA DNA SPEC QL NAA+PROBE: NORMAL
NEUTROPHILS # BLD: 6.7 K/UL (ref 1.7–7.7)
NEUTROPHILS NFR BLD: 73.2 %
PLATELET # BLD AUTO: 150 K/UL (ref 135–450)
PMV BLD AUTO: 8 FL (ref 5–10.5)
POTASSIUM SERPL-SCNC: 4.1 MMOL/L (ref 3.5–5.1)
PROT SERPL-MCNC: 6.4 G/DL (ref 6.4–8.2)
RBC # BLD AUTO: 3.7 M/UL (ref 4.2–5.9)
S PNEUM AG UR QL: NORMAL
SODIUM SERPL-SCNC: 142 MMOL/L (ref 136–145)
WBC # BLD AUTO: 9.1 K/UL (ref 4–11)

## 2025-02-07 PROCEDURE — 94761 N-INVAS EAR/PLS OXIMETRY MLT: CPT

## 2025-02-07 PROCEDURE — 99223 1ST HOSP IP/OBS HIGH 75: CPT

## 2025-02-07 PROCEDURE — 6370000000 HC RX 637 (ALT 250 FOR IP): Performed by: INTERNAL MEDICINE

## 2025-02-07 PROCEDURE — 6360000002 HC RX W HCPCS: Performed by: INTERNAL MEDICINE

## 2025-02-07 PROCEDURE — 2060000000 HC ICU INTERMEDIATE R&B

## 2025-02-07 PROCEDURE — 94640 AIRWAY INHALATION TREATMENT: CPT

## 2025-02-07 PROCEDURE — 99232 SBSQ HOSP IP/OBS MODERATE 35: CPT | Performed by: INTERNAL MEDICINE

## 2025-02-07 PROCEDURE — 36415 COLL VENOUS BLD VENIPUNCTURE: CPT

## 2025-02-07 PROCEDURE — 85025 COMPLETE CBC W/AUTO DIFF WBC: CPT

## 2025-02-07 PROCEDURE — 2700000000 HC OXYGEN THERAPY PER DAY

## 2025-02-07 PROCEDURE — 80076 HEPATIC FUNCTION PANEL: CPT

## 2025-02-07 PROCEDURE — 80048 BASIC METABOLIC PNL TOTAL CA: CPT

## 2025-02-07 PROCEDURE — 85520 HEPARIN ASSAY: CPT

## 2025-02-07 PROCEDURE — 6360000002 HC RX W HCPCS

## 2025-02-07 PROCEDURE — 6360000002 HC RX W HCPCS: Performed by: STUDENT IN AN ORGANIZED HEALTH CARE EDUCATION/TRAINING PROGRAM

## 2025-02-07 PROCEDURE — 2500000003 HC RX 250 WO HCPCS: Performed by: INTERNAL MEDICINE

## 2025-02-07 PROCEDURE — 6370000000 HC RX 637 (ALT 250 FOR IP): Performed by: STUDENT IN AN ORGANIZED HEALTH CARE EDUCATION/TRAINING PROGRAM

## 2025-02-07 PROCEDURE — 2580000003 HC RX 258: Performed by: INTERNAL MEDICINE

## 2025-02-07 PROCEDURE — 99233 SBSQ HOSP IP/OBS HIGH 50: CPT | Performed by: INTERNAL MEDICINE

## 2025-02-07 PROCEDURE — 99232 SBSQ HOSP IP/OBS MODERATE 35: CPT | Performed by: STUDENT IN AN ORGANIZED HEALTH CARE EDUCATION/TRAINING PROGRAM

## 2025-02-07 RX ORDER — METOPROLOL SUCCINATE 25 MG/1
25 TABLET, EXTENDED RELEASE ORAL DAILY
Status: DISCONTINUED | OUTPATIENT
Start: 2025-02-07 | End: 2025-02-10 | Stop reason: HOSPADM

## 2025-02-07 RX ORDER — IPRATROPIUM BROMIDE AND ALBUTEROL SULFATE 2.5; .5 MG/3ML; MG/3ML
1 SOLUTION RESPIRATORY (INHALATION) 3 TIMES DAILY
Status: DISCONTINUED | OUTPATIENT
Start: 2025-02-07 | End: 2025-02-10 | Stop reason: HOSPADM

## 2025-02-07 RX ORDER — FUROSEMIDE 10 MG/ML
20 INJECTION INTRAMUSCULAR; INTRAVENOUS ONCE
Status: COMPLETED | OUTPATIENT
Start: 2025-02-07 | End: 2025-02-07

## 2025-02-07 RX ORDER — SPIRONOLACTONE 25 MG/1
12.5 TABLET ORAL DAILY
Status: DISCONTINUED | OUTPATIENT
Start: 2025-02-07 | End: 2025-02-08

## 2025-02-07 RX ORDER — OLANZAPINE 10 MG/2ML
2.5 INJECTION, POWDER, FOR SOLUTION INTRAMUSCULAR EVERY 6 HOURS PRN
Status: DISCONTINUED | OUTPATIENT
Start: 2025-02-07 | End: 2025-02-10 | Stop reason: HOSPADM

## 2025-02-07 RX ORDER — OLANZAPINE 5 MG/1
2.5 TABLET ORAL EVERY 6 HOURS PRN
Status: DISCONTINUED | OUTPATIENT
Start: 2025-02-07 | End: 2025-02-10 | Stop reason: HOSPADM

## 2025-02-07 RX ORDER — LOSARTAN POTASSIUM 25 MG/1
25 TABLET ORAL DAILY
Status: DISCONTINUED | OUTPATIENT
Start: 2025-02-07 | End: 2025-02-10 | Stop reason: HOSPADM

## 2025-02-07 RX ORDER — PANTOPRAZOLE SODIUM 40 MG/1
40 TABLET, DELAYED RELEASE ORAL
Status: DISCONTINUED | OUTPATIENT
Start: 2025-02-08 | End: 2025-02-10 | Stop reason: HOSPADM

## 2025-02-07 RX ADMIN — EMTRICITABINE AND TENOFOVIR DISOPROXIL FUMARATE 1 TABLET: 200; 300 TABLET, FILM COATED ORAL at 08:38

## 2025-02-07 RX ADMIN — METOPROLOL SUCCINATE 25 MG: 25 TABLET, EXTENDED RELEASE ORAL at 11:35

## 2025-02-07 RX ADMIN — OSELTAMIVIR PHOSPHATE 75 MG: 75 CAPSULE ORAL at 20:41

## 2025-02-07 RX ADMIN — OSELTAMIVIR PHOSPHATE 75 MG: 75 CAPSULE ORAL at 08:38

## 2025-02-07 RX ADMIN — IPRATROPIUM BROMIDE AND ALBUTEROL SULFATE 1 DOSE: .5; 2.5 SOLUTION RESPIRATORY (INHALATION) at 19:35

## 2025-02-07 RX ADMIN — HEPARIN SODIUM 12 UNITS/KG/HR: 10000 INJECTION, SOLUTION INTRAVENOUS at 22:32

## 2025-02-07 RX ADMIN — WATER 40 MG: 1 INJECTION INTRAMUSCULAR; INTRAVENOUS; SUBCUTANEOUS at 08:21

## 2025-02-07 RX ADMIN — IPRATROPIUM BROMIDE AND ALBUTEROL SULFATE 1 DOSE: 2.5; .5 SOLUTION RESPIRATORY (INHALATION) at 05:49

## 2025-02-07 RX ADMIN — PREDNISONE 30 MG: 20 TABLET ORAL at 14:20

## 2025-02-07 RX ADMIN — HEPARIN SODIUM 12 UNITS/KG/HR: 10000 INJECTION, SOLUTION INTRAVENOUS at 01:20

## 2025-02-07 RX ADMIN — IPRATROPIUM BROMIDE AND ALBUTEROL SULFATE 1 DOSE: 2.5; .5 SOLUTION RESPIRATORY (INHALATION) at 11:22

## 2025-02-07 RX ADMIN — LOSARTAN POTASSIUM 25 MG: 25 TABLET, FILM COATED ORAL at 08:38

## 2025-02-07 RX ADMIN — FUROSEMIDE 20 MG: 10 INJECTION, SOLUTION INTRAMUSCULAR; INTRAVENOUS at 10:21

## 2025-02-07 RX ADMIN — PANTOPRAZOLE SODIUM 40 MG: 40 INJECTION, POWDER, LYOPHILIZED, FOR SOLUTION INTRAVENOUS at 08:22

## 2025-02-07 RX ADMIN — SPIRONOLACTONE 12.5 MG: 25 TABLET ORAL at 14:20

## 2025-02-07 RX ADMIN — DOLUTEGRAVIR SODIUM 50 MG: 50 TABLET, FILM COATED ORAL at 08:38

## 2025-02-07 RX ADMIN — ASPIRIN 81 MG: 81 TABLET, COATED ORAL at 08:38

## 2025-02-07 NOTE — FLOWSHEET NOTE
02/06/25 1926   Assessment   Charting Type Shift assessment   Psychosocial   Psychosocial (WDL) WDL   Neurological   Neuro (WDL) X   Level of Consciousness 0   HEENT (Head, Ears, Eyes, Nose, & Throat)   HEENT (WDL) X   Teeth Missing teeth   Right Eye Impaired vision   Left Eye Impaired vision   Respiratory   Respiratory (WDL) X   Respiratory Interventions Cough & deep breathe   Respiratory Pattern Tachypneic   Respiratory Depth Shallow   Respiratory Quality/Effort Dyspnea with exertion   Chest Assessment Chest expansion symmetrical;Trachea midline   L Breath Sounds Expiratory Wheezes;Rhonchi   R Breath Sounds Expiratory Wheezes   Level of Activity/Mobility 2   Cardiac   Cardiac (WDL) X   Cardiac Regularity Regular   Heart Sounds S1, S2   Cardiac Rhythm Sinus tachy   Cardiac Symptoms   (SOB)   Gastrointestinal   Abdominal (WDL) WDL   Genitourinary   Genitourinary (WDL) X  (external catheter)   Peripheral Vascular   Peripheral Vascular (WDL) WDL   Skin Integumentary    Skin Integumentary (WDL) WDL   Musculoskeletal   Musculoskeletal (WDL) X  (generalized weakness)

## 2025-02-07 NOTE — PROGRESS NOTES
Spoke with legal guardian Arjun updated on care ok if have to add any oral psych meds for behaviors.States he is not seeing pt next 3 hours if anyone needed to speak with him,if unable to answer asked to leave message with call back number and when someone can be reached for him to call back to  In to see pt,untied restraints and sat up for lunch-pt feeding himself and calm

## 2025-02-07 NOTE — PROGRESS NOTES
Infectious Disease Follow up Notes    CC :  influenza, PE, HIV+     Antibiotics:  Oseltamivir s 2/6  Dolutegravir/Truvada     solumedrol     Admit Date:   2/5/2025  Hospital Day: 3    Subjective:   He is AF today   On RA   Heparin infusion   He is in soft wrist restraints  He say he feels ok, no specific complaints voiced     Objective:     Patient Vitals for the past 8 hrs:   BP Temp Temp src Pulse Resp SpO2   02/07/25 0725 (!) 155/89 98 °F (36.7 °C) Oral 93 18 93 %   02/07/25 0549 -- -- -- -- -- 93 %   02/07/25 0420 (!) 144/90 98.1 °F (36.7 °C) Oral 95 18 92 %       EXAM:  General:   alert, conversant, NAD    HEENT:   NCAT, PERRL, sclera anicteric  NECK:  supple   LUNGS:  non-labored breathing  No wheezing     ABD:  soft, flat, NT    EXT: No focal rash        LINE:   PIV in place         Scheduled Meds:   losartan  25 mg Oral Daily    spironolactone  12.5 mg Oral Daily    furosemide  20 mg IntraVENous Once    metoprolol succinate  25 mg Oral Daily    ipratropium 0.5 mg-albuterol 2.5 mg  1 Dose Inhalation 4x Daily RT    aspirin  81 mg Oral Daily    methylPREDNISolone  40 mg IntraVENous Daily    pantoprazole (PROTONIX) 40 mg in sodium chloride (PF) 0.9 % 10 mL injection  40 mg IntraVENous Daily    oseltamivir  75 mg Oral BID    dolutegravir sodium  50 mg Oral Daily    emtricitabine-tenofovir  1 tablet Oral Daily    sodium chloride flush  5-40 mL IntraVENous 2 times per day       Continuous Infusions:   sodium chloride      heparin (PORCINE) Infusion 14 Units/kg/hr (02/07/25 0621)          Data Review:    Lab Results   Component Value Date    WBC 9.1 02/07/2025    HGB 12.9 (L) 02/07/2025    HCT 37.5 (L) 02/07/2025    .2 (H) 02/07/2025     02/07/2025     Lab Results   Component Value Date    CREATININE 0.9 02/07/2025    BUN 15 02/07/2025     02/07/2025    K 4.1 02/07/2025     02/07/2025    CO2 23 02/07/2025        Hepatic Function Panel:   Lab Results   Component Value Date/Time    ALKPHOS 49 02/07/2025 04:52 AM    ALT 21 02/07/2025 04:52 AM     02/07/2025 04:52 AM    BILITOT 0.5 02/07/2025 04:52 AM    BILIDIR 0.2 02/07/2025 04:52 AM    IBILI 0.3 02/07/2025 04:52 AM       Cultures:   2/5 BC x2 NGTD              Flu ag neg               Rapid RSV ag neg              COVID NAAT neg              UA neg   2/6 MRSA screen neg   Resp PCR panel +FuA        Radiology Review:  All pertinent images / reports were reviewed as a part of this visit.      CXR negative   CT head, C spine negative      CTPA 2/6/25  IMPRESSION:  Nonobstructing pulmonary emboli are present in a branch to the posteromedial aspect of the right lower lobe inferiorly. Nonobstructing pulmonary embolus  is also present in the main pulmonary artery.    2/6/25   Fish LE doppler neg SVT/DVT    Assessment:     Patient Active Problem List    Diagnosis Date Noted    Acute pulmonary embolism without acute cor pulmonale ([facility]) 02/06/2025    NSTEMI (non-ST elevated myocardial infarction) ([facility]) 02/06/2025    Prolonged QT interval 02/06/2025    Acute heart failure with reduced ejection fraction (HFrEF, <= 40%) ([facility]) 02/06/2025    Flu 02/06/2025    Septicemia ([facility]) 02/05/2025    Encounter for general medical examination 10/10/2023    Tobacco use 10/10/2023    Paranoid schizophrenia ([facility]) 10/07/2023    Bipolar affective disorder, mixed, severe ([facility]) 02/07/2014    Altered mental status 01/30/2014    Dementia ([facility]) 01/29/2014    Cognitive disorder 01/24/2014    Hyperammonemia ([facility]) 08/08/2013    Psychosis ([facility]) 07/28/2013     Overview Note:     Overview:   Concern for psychiatric etiology as significant paranoia on admission.  On Risperdal, MRI with periventricular white matter disease.  LP was unremarkable.pending RPR and VDRL  Mild ammonia increase, not previously on Depakote  Lab Results   Component Value Date    [id number] 312 7/30/2013    FOLATE 18.30* 7/30/2013

## 2025-02-07 NOTE — PLAN OF CARE
Problem: Chronic Conditions and Co-morbidities  Goal: Patient's chronic conditions and co-morbidity symptoms are monitored and maintained or improved  Outcome: Progressing     Problem: Discharge Planning  Goal: Discharge to home or other facility with appropriate resources  Outcome: Progressing     Problem: Skin/Tissue Integrity  Goal: Skin integrity remains intact  Description: 1.  Monitor for areas of redness and/or skin breakdown  2.  Assess vascular access sites hourly  3.  Every 4-6 hours minimum:  Change oxygen saturation probe site  4.  Every 4-6 hours:  If on nasal continuous positive airway pressure, respiratory therapy assess nares and determine need for appliance change or resting period  Outcome: Progressing     Problem: Safety - Adult  Goal: Free from fall injury  Outcome: Progressing     Problem: ABCDS Injury Assessment  Goal: Absence of physical injury  Outcome: Progressing     Problem: Safety - Medical Restraint  Goal: Remains free of injury from restraints (Restraint for Interference with Medical Device)  Description: INTERVENTIONS:  1. Determine that other, less restrictive measures have been tried or would not be effective before applying the restraint  2. Evaluate the patient's condition at the time of restraint application  3. Inform patient/family regarding the reason for restraint  4. Q2H: Monitor safety, psychosocial status, comfort, nutrition and hydration  Outcome: Progressing

## 2025-02-07 NOTE — PROGRESS NOTES
Progress Note    Admit Date:  2/5/2025      Admitted for shortness of breath, +PE on CTPA, started on Heparin gtt, NSTEMI, cardiology, pulmonary and ID consulted  Hx of HIV     Subjective:  Mr. Gloria seen at bedside. Says he is a sleepy but feels fine, no pain, no shortness of breath.     Objective:   Patient Vitals for the past 4 hrs:   BP Temp Temp src Pulse Resp SpO2   02/07/25 0725 (!) 155/89 98 °F (36.7 °C) Oral 93 18 93 %   02/07/25 0549 -- -- -- -- -- 93 %          Intake/Output Summary (Last 24 hours) at 2/7/2025 0824  Last data filed at 2/7/2025 0818  Gross per 24 hour   Intake 0 ml   Output 2325 ml   Net -2325 ml       Physical Exam:    Gen: No distress. Alert.   Eyes: PERRL. No sclera icterus. No conjunctival injection.   ENT: No discharge. Pharynx clear.   Neck: No JVD.  Trachea midline.  Resp: Mild increased wob, wheezing bilaterally  CV: Regular rate. Regular rhythm. No murmur.  No rub. No edema.   Capillary Refill: Brisk,< 3 seconds   Peripheral Pulses: +2 palpable, equal bilaterally   GI: Non-tender. Non-distended.  Normal bowel sounds.  Skin: Warm and dry. No nodule on exposed extremities. No rash on exposed extremities.   M/S: No cyanosis. No joint deformity. No clubbing.   Neuro: Awake. Grossly nonfocal    Psych: No agitation when seen    Scheduled Meds:   losartan  25 mg Oral Daily    spironolactone  12.5 mg Oral Daily    ipratropium 0.5 mg-albuterol 2.5 mg  1 Dose Inhalation 4x Daily RT    aspirin  81 mg Oral Daily    methylPREDNISolone  40 mg IntraVENous Daily    pantoprazole (PROTONIX) 40 mg in sodium chloride (PF) 0.9 % 10 mL injection  40 mg IntraVENous Daily    oseltamivir  75 mg Oral BID    dolutegravir sodium  50 mg Oral Daily    emtricitabine-tenofovir  1 tablet Oral Daily    sodium chloride flush  5-40 mL IntraVENous 2 times per day       Continuous Infusions:   sodium chloride      heparin (PORCINE) Infusion 14 Units/kg/hr (02/07/25 0621)       PRN Meds:  albuterol, sodium chloride

## 2025-02-07 NOTE — PROGRESS NOTES
Niece Glenis here and plan of care discussed at bedside she feels mentally pt behavior today is his baseline she states  he feels everything is a conspiracy and nontrustful.Explained all pt current medical conditions.  Spoke with Marilee at [facility] and update given on patient

## 2025-02-07 NOTE — PROGRESS NOTES
02/06/25 1900   RT Protocol   History Pulmonary Disease 1   Respiratory pattern 2   Breath sounds 6   Cough 3   Indications for Bronchodilator Therapy Wheezing associated with pulm disorder   Bronchodilator Assessment Score 12     RT Inhaler-Nebulizer Bronchodilator Protocol Note    There is a bronchodilator order in the chart from a provider indicating to follow the RT Bronchodilator Protocol and there is an “Initiate RT Inhaler-Nebulizer Bronchodilator Protocol” order as well (see protocol at bottom of note).    CXR Findings:  XR CHEST PORTABLE    Result Date: 2/5/2025  No acute cardiopulmonary findings.       The findings from the last RT Protocol Assessment were as follows:   History Pulmonary Disease: Smoker 15 pack years or more  Respiratory Pattern: Dyspnea on exertion or RR 21-25 bpm  Breath Sounds: Inspiratory and expiratory or bilateral wheezing and/or rhonchi  Cough: Weak, non-productive  Indication for Bronchodilator Therapy: Wheezing associated with pulm disorder  Bronchodilator Assessment Score: 12    Aerosolized bronchodilator medication orders have been revised according to the RT Inhaler-Nebulizer Bronchodilator Protocol below.    Respiratory Therapist to perform RT Therapy Protocol Assessment initially then follow the protocol.  Repeat RT Therapy Protocol Assessment PRN for score 0-3 or on second treatment, BID, and PRN for scores above 3.    No Indications - adjust the frequency to every 6 hours PRN wheezing or bronchospasm, if no treatments needed after 48 hours then discontinue using Per Protocol order mode.     If indication present, adjust the RT bronchodilator orders based on the Bronchodilator Assessment Score as indicated below.  Use Inhaler orders unless patient has one or more of the following: on home nebulizer, not able to hold breath for 10 seconds, is not alert and oriented, cannot activate and use MDI correctly, or respiratory rate 25 breaths per minute or more, then use the  equivalent nebulizer order(s) with same Frequency and PRN reasons based on the score.  If a patient is on this medication at home then do not decrease Frequency below that used at home.    0-3 - enter or revise RT bronchodilator order(s) to equivalent RT Bronchodilator order with Frequency of every 4 hours PRN for wheezing or increased work of breathing using Per Protocol order mode.        4-6 - enter or revise RT Bronchodilator order(s) to two equivalent RT bronchodilator orders with one order with BID Frequency and one order with Frequency of every 4 hours PRN wheezing or increased work of breathing using Per Protocol order mode.        7-10 - enter or revise RT Bronchodilator order(s) to two equivalent RT bronchodilator orders with one order with TID Frequency and one order with Frequency of every 4 hours PRN wheezing or increased work of breathing using Per Protocol order mode.       11-13 - enter or revise RT Bronchodilator order(s) to one equivalent RT bronchodilator order with QID Frequency and an Albuterol order with Frequency of every 4 hours PRN wheezing or increased work of breathing using Per Protocol order mode.      Greater than 13 - enter or revise RT Bronchodilator order(s) to one equivalent RT bronchodilator order with every 4 hours Frequency and an Albuterol order with Frequency of every 2 hours PRN wheezing or increased work of breathing using Per Protocol order mode.       Electronically signed by Kike Galarza RCP on 2/6/2025 at 7:39 PM

## 2025-02-07 NOTE — DISCHARGE INSTRUCTIONS
Start on low dose ASA   ELIQUIS 10 mg bid x 7 days then 5 mg bid x 3 months    F/w Cardiology in 2 weeks         Heart Failure Resources:  Heart Failure Interactive Workbook:  Go to https://Touchotel.kissnofrog/publication/?e=944638 for a Free Heart Failure Interactive Workbook provided by The American Heart Association. This interactive workbook will provide information on Healthier Living with Heart Failure. Please copy and paste link into search bar. Use your mouse to scroll through the pages.    HF [place] castillo:   Heart Failure Free smart phone castillo available for iPhone and Android download. Use your phone to track sodium intake, fluid intake, symptoms, and weight.     Low Sodium Diet / Recipes:  Go to www.[employer] website for “renal” diet which is Low Sodium! [employer] is a dialysis company, but this website offers free seasonal cookbooks. Each quarter, they will release 25-30 new recipes with a breakdown of calories, sodium, and glucose. You can also go to www.[employer]/recipes website for free recipes.     Discharge Instruction Video:  Scan the QR code below with your camera and click the canva.com link to open the video and watch educational information on Heart Failure and Medications from one of our nurses.   [email or web address].[employer]/design/DAFZnsH_JRk/8BqnifyNKTRweRPygE9krv/edit    Home Exercise Program:   Identification of Green/Yellow/Red zones:  You should be able to identify when you feel good (green zone), if you have 1-2 symptoms of HF (yellow zone), or if you are in need of medical attention (red zone).  In your CHF education folder you were provided a “stop light tool” to outline this information.     We want to you to rate your exertion levels:    Our therapy team has discussed means of identification with you such as the \"Leonid scale.\"  The Leonid rating scale ranges from 6 to 20, where 6 means \"no exertion at all\" and 20 means \"maximal exertion.\" The goal is to use this to gauge how much effort

## 2025-02-07 NOTE — FLOWSHEET NOTE
02/07/25 0725   Vital Signs   Temp 98 °F (36.7 °C)   Temp Source Oral   Pulse 93   Heart Rate Source Monitor   Respirations 18   BP (!) 155/89   MAP (Calculated) 111   BP Location Right upper arm   BP Method Automatic   Patient Position Supine   Oxygen Therapy   SpO2 93 %   O2 Device None (Room air)     Am assessment complete pt awoke with assess and oriented to place and person but said we were going to outerspace and that his nephew and niece are in \"hell.\" Cooperative with taking meds restraints remain pt attempt to remove IV.

## 2025-02-07 NOTE — PROGRESS NOTES
Bedside report and transfer of care given to NICOLE Mendez. Pt currently resting in bed with the call light within reach. Pt denies any other care needs at this time. Pt stable at this time.

## 2025-02-07 NOTE — PROGRESS NOTES
2/7  Anti-Xa = 0.29 at 0452.  Increase heparin gtt to 14 units/kg/hr.  Recheck Anti-Xa in 6 hours.  Kike Young PharmD  2/7/2025 6:12 AM

## 2025-02-07 NOTE — PROGRESS NOTES
CARDIOLOGY Progress Note        Patient Name: Jeremie Gloria  Date of admission: 2/5/2025  6:52 PM  Admission Dx: Septicemia (formerly [facility]) [A41.9]  Prolonged QT interval [R94.31]  NSTEMI (non-ST elevated myocardial infarction) (formerly [facility]) [I21.4]  Closed head injury, initial encounter [S09.90XA]  Sepsis (formerly [facility]) [A41.9]  Requesting Physician: Fabián Enamorado MD  Primary Care physician: Natalie Le MD    Reason for Consultation/Chief Complaint: \"NSTEMI\"    Subjective:  Patient laying flat.  In restraints.  Patient is awake and alert.  Agitated and confused.  He states \" it does not matter what you tell me, I am not going to be here anyways.  I am leaving.  I am going to outer space.  You did this to me.  You caused caused my heart to be weak.  You will be arrested and going to intermediate.\"       History of Present Illness:     Jeremie Gloria is a 79 y.o. patient with past medical history of HIV, dementia, schizoaffective disorder, GERD, hypertension, history of seizures who presented to the hospital with complaints of unwitnessed fall at his extended care facility.  Patient reportedly lost his balance while ambulating with his walker.  Patient reports shortness of breath.  Reports wheezing and cough.  Denies any chest pain.  Denies lower extremity edema.  Denies palpitations.    Presenting EKG sinus tachycardia with a rate of 112 bpm, possible right atrial enlargement, inferior infarct cited on or before October 6, 2023, possible anterolateral infarct cited on or before October 6, 2023, nonspecific ST abnormalities, prolonged QTc 647 ms.  Repeat ECG significant artifact but overall similar aside from faster rates with sinus tachycardia 127 bpm.  ECG today sinus tachycardia 115 bpm, QTc 630 ms nonspecific ST abnormalities.  Otherwise similar to priors.    CT head and CT C-spine without acute abnormalities.    Past Medical History:   has a past medical history of Depression, Diabetes mellitus (formerly [facility]), Erectile dysfunction, High  modification.  All questions and concerns were addressed to the patient/family. Alternatives to my treatment were discussed.     Thank you for allowing us to participate in the care of Jeremie Gloria. Please call me with any questions [phone].    Chuy Durán, DO   Cardiovascular Disease  [facility]  [phone] [place] Office  [phone] [place] Office  2/7/2025 8:49 AM

## 2025-02-07 NOTE — PLAN OF CARE
Problem: Chronic Conditions and Co-morbidities  Goal: Patient's chronic conditions and co-morbidity symptoms are monitored and maintained or improved  2/7/2025 1051 by Jeana Richardson RN  Outcome: Progressing  2/6/2025 2113 by Juany Saunders RN  Outcome: Progressing     Problem: Discharge Planning  Goal: Discharge to home or other facility with appropriate resources  2/7/2025 1051 by Jeana Richardson RN  Outcome: Progressing  2/6/2025 2113 by Juany Saunders RN  Outcome: Progressing     Problem: Skin/Tissue Integrity  Goal: Skin integrity remains intact  Description: 1.  Monitor for areas of redness and/or skin breakdown  2.  Assess vascular access sites hourly  3.  Every 4-6 hours minimum:  Change oxygen saturation probe site  4.  Every 4-6 hours:  If on nasal continuous positive airway pressure, respiratory therapy assess nares and determine need for appliance change or resting period  2/7/2025 1051 by Jeana Richardson RN  Outcome: Progressing  2/6/2025 2113 by Juany Saunders RN  Outcome: Progressing     Problem: Safety - Adult  Goal: Free from fall injury  2/7/2025 1051 by Jeana Richardson RN  Outcome: Progressing  2/6/2025 2113 by Juany Saunders RN  Outcome: Progressing     Problem: ABCDS Injury Assessment  Goal: Absence of physical injury  2/7/2025 1051 by Jeana Richardson RN  Outcome: Progressing  2/6/2025 2113 by Juany Saunders RN  Outcome: Progressing     Problem: Safety - Medical Restraint  Goal: Remains free of injury from restraints (Restraint for Interference with Medical Device)  Description: INTERVENTIONS:  1. Determine that other, less restrictive measures have been tried or would not be effective before applying the restraint  2. Evaluate the patient's condition at the time of restraint application  3. Inform patient/family regarding the reason for restraint  4. Q2H: Monitor safety, psychosocial status, comfort, nutrition and hydration  2/7/2025 1051 by Dylan

## 2025-02-07 NOTE — PROGRESS NOTES
Paged Dr. Ortega    Patient admitted for Nstemi, +PE, and Flu A patient has a NPO order I was told they were thinking about doing something about his Nstemi but that is on hold since he is +PE and trops are trending down. Can we please have a diet order. Patient is requesting to eat.  Thank you, Juany    New orders given. Patient able to eat something now and then to be NPO again.

## 2025-02-07 NOTE — PROGRESS NOTES
Assessment completed and medications given. Patient is A&O and denies any needs at this time. Call light and personal belongings are within reach.Standard safety measures and restraints in place.

## 2025-02-07 NOTE — PROGRESS NOTES
Pulmonary Progress Note  CC: PE    Subjective:  no SOB      EXAM: /82   Pulse 91   Temp 97.9 °F (36.6 °C) (Oral)   Resp 18   Ht 1.727 m (5' 8\")   Wt 82.1 kg (181 lb)   SpO2 95%   BMI 27.52 kg/m²  on RA  Constitutional:  No acute distress.   Eyes: PERRL. Conjunctivae anicteric.   ENT: Normal nose. Normal tongue.    Neck:  Trachea is midline.   Respiratory: No accessory muscle usage.   decreased breath sounds. No wheezes. No rales. No Rhonchi.  Cardiovascular: Normal S1S2. No digit clubbing. No digit cyanosis. No LE edema.   Psychiatric: No anxiety or Agitation. Alert and Oriented to person, place and time.    Scheduled Meds:   losartan  25 mg Oral Daily    spironolactone  12.5 mg Oral Daily    metoprolol succinate  25 mg Oral Daily    [START ON 2/8/2025] pantoprazole  40 mg Oral QAM AC    ipratropium 0.5 mg-albuterol 2.5 mg  1 Dose Inhalation 4x Daily RT    aspirin  81 mg Oral Daily    methylPREDNISolone  40 mg IntraVENous Daily    oseltamivir  75 mg Oral BID    dolutegravir sodium  50 mg Oral Daily    emtricitabine-tenofovir  1 tablet Oral Daily    sodium chloride flush  5-40 mL IntraVENous 2 times per day     Continuous Infusions:   sodium chloride      heparin (PORCINE) Infusion 14 Units/kg/hr (02/07/25 0621)     PRN Meds:  OLANZapine **OR** OLANZapine, albuterol, sodium chloride flush, sodium chloride, potassium chloride **OR** potassium alternative oral replacement **OR** potassium chloride, ondansetron **OR** ondansetron, polyethylene glycol, acetaminophen **OR** acetaminophen, heparin (porcine), heparin (porcine)    Labs:  CBC:   Recent Labs     02/05/25 1944 02/06/25 0504 02/07/25 0452   WBC 11.4* 10.1 9.1   HGB 13.6 12.8* 12.9*   HCT 39.3* 36.7* 37.5*   MCV 98.8 99.5 101.2*    154 150     BMP:   Recent Labs     02/05/25 1944 02/06/25 0504 02/07/25 0452    142 142   K 3.6 4.0 4.1    109 107   CO2 23 21 23   BUN 16 14 15   CREATININE 1.1 0.9 0.9     Microbiology:  PCR flu

## 2025-02-07 NOTE — CONSULTS
Psychiatric Consult     Admit Date:  2/5/2025    Consult Date:  2/7/2025     Reason for Consult: Agitation    Summary Present Illness: Per inpatient notes:  \"Jeremie Gloria is a 79 y.o. patient with past medical history of HIV, dementia, schizoaffective disorder, GERD, hypertension, history of seizures who presented to the hospital with complaints of unwitnessed fall at his extended care facility.  Patient reportedly lost his balance while ambulating with his walker.  Patient reports shortness of breath.  Reports wheezing and cough.  Denies any chest pain.  Denies lower extremity edema.  Denies palpitations.  In restraints.  Patient is awake and alert.  Agitated and confused.  He states \" it does not matter what you tell me, I am not going to be here anyways.  I am leaving.  I am going to outer space.  You did this to me.  You caused my heart to be weak.  You will be arrested and going to skilled nursing. \"    Pt now on PCU, in soft restraints, resting calming in bed.  Pt alert to person, place, and month, believes it is 2027.  Pt states he was sent to the hospital to be killed because his father is a \"jo god\".  He also tells me that Obama and Biden just left his room to discuss his demise.  Pt has a hx of mental health issues, previous admission to [facility] with similar presentation, delusional, disorganized.  He is on a GUTIERREZ, Invega Sustenna, able to verify with Abbotaye Zuniga that he last received in Jan, next due on 2/13/25.  Pt's family visited per nursing staff and verified that this is his baseline.  PRNs were added to help with any agitation, tele-sitter in place for his safety as well. Pt will not require inpatient psychiatric admission at this time.      Psychiatric Hx:   Previous Diagnoses: schizophrenia  and major neurocognitive disorder per chart  Previous Inpt: [facility] admission 10/2023, [place] 20 years ago per patient.  Outpatient Tx: Yes but couldn't say where  Med Trials: per patient Zoloft and xanax for anxiety.

## 2025-02-08 LAB
ALBUMIN SERPL-MCNC: 3.5 G/DL (ref 3.4–5)
ALP SERPL-CCNC: 49 U/L (ref 40–129)
ALT SERPL-CCNC: 21 U/L (ref 10–40)
ANION GAP SERPL CALCULATED.3IONS-SCNC: 12 MMOL/L (ref 3–16)
ANTI-XA UNFRAC HEPARIN: 0.59 IU/ML (ref 0.3–0.7)
ANTI-XA UNFRAC HEPARIN: 0.78 IU/ML (ref 0.3–0.7)
ANTI-XA UNFRAC HEPARIN: 0.79 IU/ML (ref 0.3–0.7)
ANTI-XA UNFRAC HEPARIN: 1.03 IU/ML (ref 0.3–0.7)
AST SERPL-CCNC: 97 U/L (ref 15–37)
BASOPHILS # BLD: 0 K/UL (ref 0–0.2)
BASOPHILS NFR BLD: 0.1 %
BILIRUB DIRECT SERPL-MCNC: <0.1 MG/DL (ref 0–0.3)
BILIRUB INDIRECT SERPL-MCNC: 0.2 MG/DL (ref 0–1)
BILIRUB SERPL-MCNC: 0.3 MG/DL (ref 0–1)
BUN SERPL-MCNC: 20 MG/DL (ref 7–20)
CALCIUM SERPL-MCNC: 8.5 MG/DL (ref 8.3–10.6)
CHLORIDE SERPL-SCNC: 105 MMOL/L (ref 99–110)
CO2 SERPL-SCNC: 24 MMOL/L (ref 21–32)
CREAT SERPL-MCNC: 0.9 MG/DL (ref 0.8–1.3)
DEPRECATED RDW RBC AUTO: 13.5 % (ref 12.4–15.4)
EOSINOPHIL # BLD: 0 K/UL (ref 0–0.6)
EOSINOPHIL NFR BLD: 0 %
GFR SERPLBLD CREATININE-BSD FMLA CKD-EPI: 88 ML/MIN/{1.73_M2}
GLUCOSE SERPL-MCNC: 123 MG/DL (ref 70–99)
HCT VFR BLD AUTO: 38.4 % (ref 40.5–52.5)
HGB BLD-MCNC: 13.4 G/DL (ref 13.5–17.5)
LYMPHOCYTES # BLD: 1.1 K/UL (ref 1–5.1)
LYMPHOCYTES NFR BLD: 13.3 %
MCH RBC QN AUTO: 34.5 PG (ref 26–34)
MCHC RBC AUTO-ENTMCNC: 34.8 G/DL (ref 31–36)
MCV RBC AUTO: 99.1 FL (ref 80–100)
MONOCYTES # BLD: 0.6 K/UL (ref 0–1.3)
MONOCYTES NFR BLD: 7.4 %
NEUTROPHILS # BLD: 6.7 K/UL (ref 1.7–7.7)
NEUTROPHILS NFR BLD: 79.2 %
PLATELET # BLD AUTO: 164 K/UL (ref 135–450)
PMV BLD AUTO: 8.4 FL (ref 5–10.5)
POTASSIUM SERPL-SCNC: 3.8 MMOL/L (ref 3.5–5.1)
PROT SERPL-MCNC: 6.6 G/DL (ref 6.4–8.2)
RBC # BLD AUTO: 3.87 M/UL (ref 4.2–5.9)
SODIUM SERPL-SCNC: 141 MMOL/L (ref 136–145)
WBC # BLD AUTO: 8.5 K/UL (ref 4–11)

## 2025-02-08 PROCEDURE — 6370000000 HC RX 637 (ALT 250 FOR IP): Performed by: INTERNAL MEDICINE

## 2025-02-08 PROCEDURE — 94761 N-INVAS EAR/PLS OXIMETRY MLT: CPT

## 2025-02-08 PROCEDURE — 99233 SBSQ HOSP IP/OBS HIGH 50: CPT | Performed by: INTERNAL MEDICINE

## 2025-02-08 PROCEDURE — 85520 HEPARIN ASSAY: CPT

## 2025-02-08 PROCEDURE — 6360000002 HC RX W HCPCS: Performed by: STUDENT IN AN ORGANIZED HEALTH CARE EDUCATION/TRAINING PROGRAM

## 2025-02-08 PROCEDURE — 94640 AIRWAY INHALATION TREATMENT: CPT

## 2025-02-08 PROCEDURE — 2060000000 HC ICU INTERMEDIATE R&B

## 2025-02-08 PROCEDURE — 80048 BASIC METABOLIC PNL TOTAL CA: CPT

## 2025-02-08 PROCEDURE — 99232 SBSQ HOSP IP/OBS MODERATE 35: CPT | Performed by: STUDENT IN AN ORGANIZED HEALTH CARE EDUCATION/TRAINING PROGRAM

## 2025-02-08 PROCEDURE — 85025 COMPLETE CBC W/AUTO DIFF WBC: CPT

## 2025-02-08 PROCEDURE — 80076 HEPATIC FUNCTION PANEL: CPT

## 2025-02-08 PROCEDURE — 36415 COLL VENOUS BLD VENIPUNCTURE: CPT

## 2025-02-08 PROCEDURE — 6370000000 HC RX 637 (ALT 250 FOR IP): Performed by: STUDENT IN AN ORGANIZED HEALTH CARE EDUCATION/TRAINING PROGRAM

## 2025-02-08 RX ORDER — SPIRONOLACTONE 25 MG/1
25 TABLET ORAL DAILY
Status: DISCONTINUED | OUTPATIENT
Start: 2025-02-09 | End: 2025-02-10 | Stop reason: HOSPADM

## 2025-02-08 RX ORDER — SPIRONOLACTONE 25 MG/1
12.5 TABLET ORAL ONCE
Status: DISCONTINUED | OUTPATIENT
Start: 2025-02-08 | End: 2025-02-09

## 2025-02-08 RX ORDER — HEPARIN SODIUM 10000 [USP'U]/100ML
7 INJECTION, SOLUTION INTRAVENOUS CONTINUOUS
Status: DISCONTINUED | OUTPATIENT
Start: 2025-02-08 | End: 2025-02-09

## 2025-02-08 RX ADMIN — METOPROLOL SUCCINATE 25 MG: 25 TABLET, EXTENDED RELEASE ORAL at 08:12

## 2025-02-08 RX ADMIN — EMTRICITABINE AND TENOFOVIR DISOPROXIL FUMARATE 1 TABLET: 200; 300 TABLET, FILM COATED ORAL at 08:11

## 2025-02-08 RX ADMIN — IPRATROPIUM BROMIDE AND ALBUTEROL SULFATE 1 DOSE: .5; 2.5 SOLUTION RESPIRATORY (INHALATION) at 14:41

## 2025-02-08 RX ADMIN — HEPARIN SODIUM 8 UNITS/KG/HR: 10000 INJECTION, SOLUTION INTRAVENOUS at 08:58

## 2025-02-08 RX ADMIN — DOLUTEGRAVIR SODIUM 50 MG: 50 TABLET, FILM COATED ORAL at 08:11

## 2025-02-08 RX ADMIN — SPIRONOLACTONE 12.5 MG: 25 TABLET ORAL at 08:12

## 2025-02-08 RX ADMIN — PREDNISONE 30 MG: 20 TABLET ORAL at 08:11

## 2025-02-08 RX ADMIN — LOSARTAN POTASSIUM 25 MG: 25 TABLET, FILM COATED ORAL at 08:12

## 2025-02-08 RX ADMIN — PANTOPRAZOLE SODIUM 40 MG: 40 TABLET, DELAYED RELEASE ORAL at 05:13

## 2025-02-08 RX ADMIN — IPRATROPIUM BROMIDE AND ALBUTEROL SULFATE 1 DOSE: .5; 2.5 SOLUTION RESPIRATORY (INHALATION) at 19:33

## 2025-02-08 RX ADMIN — ASPIRIN 81 MG: 81 TABLET, COATED ORAL at 08:12

## 2025-02-08 RX ADMIN — OSELTAMIVIR PHOSPHATE 75 MG: 75 CAPSULE ORAL at 21:05

## 2025-02-08 RX ADMIN — OSELTAMIVIR PHOSPHATE 75 MG: 75 CAPSULE ORAL at 08:11

## 2025-02-08 RX ADMIN — IPRATROPIUM BROMIDE AND ALBUTEROL SULFATE 1 DOSE: .5; 2.5 SOLUTION RESPIRATORY (INHALATION) at 07:47

## 2025-02-08 NOTE — PLAN OF CARE
Problem: Chronic Conditions and Co-morbidities  Goal: Patient's chronic conditions and co-morbidity symptoms are monitored and maintained or improved  2/8/2025 0852 by Cindy Valencia RN  Outcome: Progressing  Flowsheets (Taken 2/8/2025 0815)  Care Plan - Patient's Chronic Conditions and Co-Morbidity Symptoms are Monitored and Maintained or Improved: Monitor and assess patient's chronic conditions and comorbid symptoms for stability, deterioration, or improvement  2/7/2025 2322 by Juany Saunders, RN  Outcome: Progressing     Problem: Discharge Planning  Goal: Discharge to home or other facility with appropriate resources  2/8/2025 0852 by Cindy Valencia RN  Outcome: Progressing  Flowsheets (Taken 2/8/2025 0815)  Discharge to home or other facility with appropriate resources:   Identify barriers to discharge with patient and caregiver   Arrange for needed discharge resources and transportation as appropriate   Identify discharge learning needs (meds, wound care, etc)  2/7/2025 2322 by Juany Saunders, RN  Outcome: Progressing     Problem: Skin/Tissue Integrity  Goal: Skin integrity remains intact  Description: 1.  Monitor for areas of redness and/or skin breakdown  2.  Assess vascular access sites hourly  3.  Every 4-6 hours minimum:  Change oxygen saturation probe site  4.  Every 4-6 hours:  If on nasal continuous positive airway pressure, respiratory therapy assess nares and determine need for appliance change or resting period  2/8/2025 0852 by Cindy Valencia RN  Outcome: Progressing  Flowsheets (Taken 2/8/2025 0815)  Skin Integrity Remains Intact: Monitor for areas of redness and/or skin breakdown  2/7/2025 2322 by Juany Saunders, RN  Outcome: Progressing     Problem: Safety - Adult  Goal: Free from fall injury  2/8/2025 0852 by Cindy Valencia, RN  Outcome: Progressing  2/7/2025 2322 by Juany Saunders, RN  Outcome: Progressing     Problem: ABCDS Injury Assessment  Goal: Absence of physical

## 2025-02-08 NOTE — PROGRESS NOTES
Assessment completed and medications given. Patient is A&Ox2/3 and denies any needs at this time. Call light and personal belongings are within reach. Standard safety measures and Avasys in place.

## 2025-02-08 NOTE — PROGRESS NOTES
02/07/25 1900   RT Protocol   History Pulmonary Disease 1   Respiratory pattern 2   Breath sounds 6   Cough 3   Indications for Bronchodilator Therapy Wheezing associated with pulm disorder   Bronchodilator Assessment Score 12     RT Inhaler-Nebulizer Bronchodilator Protocol Note    There is a bronchodilator order in the chart from a provider indicating to follow the RT Bronchodilator Protocol and there is an “Initiate RT Inhaler-Nebulizer Bronchodilator Protocol” order as well (see protocol at bottom of note).    CXR Findings:  XR CHEST PORTABLE    Result Date: 2/5/2025  No acute cardiopulmonary findings.       The findings from the last RT Protocol Assessment were as follows:   History Pulmonary Disease: Smoker 15 pack years or more  Respiratory Pattern: Dyspnea on exertion or RR 21-25 bpm  Breath Sounds: Inspiratory and expiratory or bilateral wheezing and/or rhonchi  Cough: Weak, non-productive  Indication for Bronchodilator Therapy: Wheezing associated with pulm disorder  Bronchodilator Assessment Score: 12    Aerosolized bronchodilator medication orders have been revised according to the RT Inhaler-Nebulizer Bronchodilator Protocol below.    Respiratory Therapist to perform RT Therapy Protocol Assessment initially then follow the protocol.  Repeat RT Therapy Protocol Assessment PRN for score 0-3 or on second treatment, BID, and PRN for scores above 3.    No Indications - adjust the frequency to every 6 hours PRN wheezing or bronchospasm, if no treatments needed after 48 hours then discontinue using Per Protocol order mode.     If indication present, adjust the RT bronchodilator orders based on the Bronchodilator Assessment Score as indicated below.  Use Inhaler orders unless patient has one or more of the following: on home nebulizer, not able to hold breath for 10 seconds, is not alert and oriented, cannot activate and use MDI correctly, or respiratory rate 25 breaths per minute or more, then use the

## 2025-02-08 NOTE — PROGRESS NOTES
2/8  Anti-Xa = 0.78 at 0126.  Decrease heparin gtt to 11 units/kg/hr.  Recheck Ant-Xa in 6 hours.  Kike Young, [place]  2/8/2025 1:53 AM

## 2025-02-08 NOTE — PROGRESS NOTES
Patient assisted to restroom. Tolerated ambulation well with walker. Patient had large BM.      Cindy Valencia RN

## 2025-02-08 NOTE — PROGRESS NOTES
2/8   Anti-Xa = 10.3 at 0809.  HOLD drip x 1 hr.  Decrease heparin gtt to 8 units/kg/hr.  Recheck Ant-Xa in 6 hours.    Mindi Rivera RP

## 2025-02-08 NOTE — PROGRESS NOTES
Pulmonary Progress Note  CC: PE    Subjective:  no SOB      EXAM: /77   Pulse 67   Temp 98 °F (36.7 °C) (Oral)   Resp 16   Ht 1.727 m (5' 8\")   Wt 82.1 kg (181 lb)   SpO2 94%   BMI 27.52 kg/m²  on RA  Constitutional:  No acute distress.   Eyes: PERRL. Conjunctivae anicteric.   ENT: Normal nose. Normal tongue.    Neck:  Trachea is midline.   Respiratory: No accessory muscle usage.   decreased breath sounds. No wheezes. No rales. No Rhonchi.  Cardiovascular: Normal S1S2. No digit clubbing. No digit cyanosis. No LE edema.   Psychiatric: No anxiety or Agitation. Alert and Oriented to person, place and time.    Scheduled Meds:   [START ON 2/9/2025] spironolactone  25 mg Oral Daily    spironolactone  12.5 mg Oral Once    losartan  25 mg Oral Daily    metoprolol succinate  25 mg Oral Daily    pantoprazole  40 mg Oral QAM AC    ipratropium 0.5 mg-albuterol 2.5 mg  1 Dose Inhalation TID    predniSONE  30 mg Oral Daily    aspirin  81 mg Oral Daily    oseltamivir  75 mg Oral BID    dolutegravir sodium  50 mg Oral Daily    emtricitabine-tenofovir  1 tablet Oral Daily    sodium chloride flush  5-40 mL IntraVENous 2 times per day     Continuous Infusions:   heparin (PORCINE) Infusion 8 Units/kg/hr (02/08/25 0858)    sodium chloride       PRN Meds:  OLANZapine **OR** OLANZapine, albuterol, sodium chloride flush, sodium chloride, potassium chloride **OR** potassium alternative oral replacement **OR** potassium chloride, ondansetron **OR** ondansetron, polyethylene glycol, acetaminophen **OR** acetaminophen, heparin (porcine), heparin (porcine)    Labs:  CBC:   Recent Labs     02/06/25  0504 02/07/25 0452 02/08/25 0448   WBC 10.1 9.1 8.5   HGB 12.8* 12.9* 13.4*   HCT 36.7* 37.5* 38.4*   MCV 99.5 101.2* 99.1    150 164     BMP:   Recent Labs     02/06/25  0504 02/07/25 0452 02/08/25 0448    142 141   K 4.0 4.1 3.8    107 105   CO2 21 23 24   BUN 14 15 20   CREATININE 0.9 0.9 0.9      Microbiology:  PCR flu +     Imaging:  Chest images independently reviewed by me and showed: CTPA  Nonobstructing pulmonary emboli are present in a branch to the posteromedial  aspect of the right lower lobe inferiorly. Nonobstructing pulmonary embolus  is also present in the main pulmonary artery.        ASSESSMENT:  Acute non-occlusive PE  Influenza A  Encephalopathy-not sure what his baseline is  Respiratory stress-currently resolved  Non-STEMI  Cardiomyopathy  Tobacco abuse  Dementia     PLAN:  Heparin drip until it is clear no other procedures will be performed and then transition to NOAC for three months  Prednisone 30mg x 3 days  Duonebs  Cardiology following for non-STEMI  On Tamiflu  ID following for HIV   Lasix per cardiology  I will sign off please call with questions

## 2025-02-08 NOTE — FLOWSHEET NOTE
02/08/25 1549   Vital Signs   Temp 98.2 °F (36.8 °C)   Temp Source Oral   Pulse 75   Heart Rate Source Monitor   Respirations 18   BP (!) 143/88   MAP (Calculated) 106   BP Location Right upper arm   BP Method Automatic   Patient Position Semi fowlers   Oxygen Therapy   SpO2 96 %   O2 Device None (Room air)     Vitals and reassessment completed. No significant changes. AntiXa drawn at this time.     Cindy Valencia RN

## 2025-02-08 NOTE — PROGRESS NOTES
2/8/2025  Current drip rate = 8 units/kg//hr  Anti-Xa = 0.79 at 1553.  Per protocol decrease heparin gtt to 7 units/kg/hr.  Recheck Anti-Xa in 6 hours.    Eric Blizzard, RPH 2/8/2025 4:20 PM

## 2025-02-08 NOTE — PLAN OF CARE
Problem: Chronic Conditions and Co-morbidities  Goal: Patient's chronic conditions and co-morbidity symptoms are monitored and maintained or improved  2/7/2025 2322 by Juany Saunders, RN  Outcome: Progressing     Problem: Discharge Planning  Goal: Discharge to home or other facility with appropriate resources  2/7/2025 2322 by Juany Saunders, RN  Outcome: Progressing     Problem: Skin/Tissue Integrity  Goal: Skin integrity remains intact  Description: 1.  Monitor for areas of redness and/or skin breakdown  2.  Assess vascular access sites hourly  3.  Every 4-6 hours minimum:  Change oxygen saturation probe site  4.  Every 4-6 hours:  If on nasal continuous positive airway pressure, respiratory therapy assess nares and determine need for appliance change or resting period  2/7/2025 2322 by Juany Saunders, RN  Outcome: Progressing     Problem: Safety - Adult  Goal: Free from fall injury  2/7/2025 2322 by Juany Saunders, RN  Outcome: Progressing

## 2025-02-08 NOTE — PROGRESS NOTES
AntiXA came back critical at 1.03. Called pharmacy to re-dose heparin gtt.     Cindy Valencia RN

## 2025-02-08 NOTE — FLOWSHEET NOTE
02/08/25 1120   Vital Signs   Temp 98 °F (36.7 °C)   Temp Source Oral   Pulse 67   Heart Rate Source Monitor   Respirations 16   /77   MAP (Calculated) 93   BP Location Right upper arm   BP Method Automatic   Patient Position Semi fowlers   Oxygen Therapy   SpO2 94 %   O2 Device None (Room air)     Vitals and reassessment completed. No significant changes. Patient resting comfortably in bed. No further needs at this time.     Cindy Valencia RN

## 2025-02-08 NOTE — FLOWSHEET NOTE
02/08/25 0809   Vital Signs   Temp 98 °F (36.7 °C)   Temp Source Oral   Pulse 78   Heart Rate Source Monitor   Respirations 20   BP (!) 143/86   MAP (Calculated) 105   BP Location Right upper arm   BP Method Automatic   Patient Position Sitting   Oxygen Therapy   SpO2 96 %   O2 Device None (Room air)     Vitals and assessment completed. No s/s of distress. Patient up in bed eating breakfast. Medications given per MAR without complications. No further needs at this time. Heparin gtt still infusing.     Cindy Valencia RN

## 2025-02-08 NOTE — FLOWSHEET NOTE
02/07/25 2039   Assessment   Charting Type Shift assessment   Psychosocial   Psychosocial (WDL) WDL   Neurological   Neuro (WDL) X   Level of Consciousness 0   Orientation Level Oriented to person;Oriented to place;Disoriented to time;Disoriented to situation   Cognition Follows commands   Speech Clear   Mascot Coma Scale   Eye Opening 4   Best Verbal Response 4   Best Motor Response 6   Lesley Coma Scale Score 14   HEENT (Head, Ears, Eyes, Nose, & Throat)   HEENT (WDL) X   Teeth Missing teeth   Right Eye Impaired vision   Left Eye Impaired vision   Respiratory   Respiratory (WDL) X   Respiratory Pattern Regular   Respiratory Depth Shallow   Respiratory Quality/Effort Dyspnea with exertion   Chest Assessment Chest expansion symmetrical;Trachea midline   L Breath Sounds Diminished   R Breath Sounds Diminished   Cardiac   Cardiac (WDL) X   Cardiac Regularity Regular   Heart Sounds S1, S2   Cardiac Rhythm Sinus rhythm   Cardiac Monitor   Cardiac/Telemetry Monitor On Portable telemetry pack applied   Alarm Audible Centralized cardiac monitoring   Alarms Set Centralized cardiac monitoring   Pacemaker   Pacemaker No   Genitourinary   Genitourinary (WDL) X  (external catheter)   Urine Assessment   Urinary Status External catheter;Voiding   Peripheral Vascular   Peripheral Vascular (WDL) WDL   Anti-Embolism   Anti-Embolism Intervention Medication  (heparin gtt)   Skin Integumentary    Skin Integumentary (WDL) WDL   Musculoskeletal   Musculoskeletal (WDL) X  (generalized weakness)

## 2025-02-08 NOTE — PROGRESS NOTES
CARDIOLOGY Progress Note        Patient Name: Jeremie Gloria  Date of admission: 2/5/2025  6:52 PM  Admission Dx: Septicemia ([facility]) [A41.9]  Prolonged QT interval [R94.31]  NSTEMI (non-ST elevated myocardial infarction) ([facility]) [I21.4]  Closed head injury, initial encounter [S09.90XA]  Sepsis ([facility]) [A41.9]  Requesting Physician: Fabián Enamorado MD  Primary Care physician: Natalie Le MD    Reason for Consultation/Chief Complaint: \"NSTEMI\"    Subjective:  Patient laying flat.  Patient more cooperative today.  Denies any chest pain or shortness of breath.  Telemetry overnight, sinus rhythm, NSVT x 1 episode 3 beats    History of Present Illness:     Jeremie Gloria is a 79 y.o. patient with past medical history of HIV, dementia, schizoaffective disorder, GERD, hypertension, history of seizures who presented to the hospital with complaints of unwitnessed fall at his extended care facility.  Patient reportedly lost his balance while ambulating with his walker.  Patient reports shortness of breath.  Reports wheezing and cough.  Denies any chest pain.  Denies lower extremity edema.  Denies palpitations.    Presenting EKG sinus tachycardia with a rate of 112 bpm, possible right atrial enlargement, inferior infarct cited on or before October 6, 2023, possible anterolateral infarct cited on or before October 6, 2023, nonspecific ST abnormalities, prolonged QTc 647 ms.  Repeat ECG significant artifact but overall similar aside from faster rates with sinus tachycardia 127 bpm.  ECG today sinus tachycardia 115 bpm, QTc 630 ms nonspecific ST abnormalities.  Otherwise similar to priors.    CT head and CT C-spine without acute abnormalities.    Past Medical History:   has a past medical history of Depression, Diabetes mellitus ([facility]), Erectile dysfunction, High triglycerides, HIV (human immunodeficiency virus infection) ([facility]), Hyperlipidemia, Hypertension, Idiopathic gout, Intermittent confusion, Muscle weakness,  modification.  All questions and concerns were addressed to the patient/family. Alternatives to my treatment were discussed.     Thank you for allowing us to participate in the care of Jeremie Gloria. Please call me with any questions [phone].    Chuy Durán, DO   Cardiovascular Disease  [facility]  [phone] [place] Office  [phone] [place] Office  2/8/2025 9:21 AM

## 2025-02-08 NOTE — PROGRESS NOTES
02/08/25 0700   RT Protocol   History Pulmonary Disease 1   Respiratory pattern 2   Breath sounds 2   Cough 0   Indications for Bronchodilator Therapy Decreased or absent breath sounds   Bronchodilator Assessment Score 5

## 2025-02-08 NOTE — PROGRESS NOTES
Progress Note    Admit Date:  2/5/2025      Admitted for shortness of breath, +PE on CTPA, started on Heparin gtt, NSTEMI, cardiology, pulmonary and ID consulted  Hx of HIV     Subjective:  Mr. Gloria seen at bedside. He says he feels fine. More alert and answering questions but he keeps his eyes closed.     Objective:   Patient Vitals for the past 4 hrs:   BP Temp Temp src Pulse Resp SpO2   02/08/25 0427 (!) 142/92 98 °F (36.7 °C) Oral 70 17 96 %          Intake/Output Summary (Last 24 hours) at 2/8/2025 0768  Last data filed at 2/8/2025 0444  Gross per 24 hour   Intake 640 ml   Output 2400 ml   Net -1760 ml       Physical Exam:    Gen: No distress. Alert.   Eyes: PERRL. No sclera icterus. No conjunctival injection.   ENT: No discharge. Pharynx clear.   Neck: No JVD.  Trachea midline.  Resp: Mild increased wob, wheezing bilaterally  CV: Regular rate. Regular rhythm. No murmur.  No rub. No edema.   Capillary Refill: Brisk,< 3 seconds   Peripheral Pulses: +2 palpable, equal bilaterally   GI: Non-tender. Non-distended.  Normal bowel sounds.  Skin: Warm and dry. No nodule on exposed extremities. No rash on exposed extremities.   M/S: No cyanosis. No joint deformity. No clubbing.   Neuro: Awake. Grossly nonfocal    Psych: No agitation when seen    Scheduled Meds:   losartan  25 mg Oral Daily    spironolactone  12.5 mg Oral Daily    metoprolol succinate  25 mg Oral Daily    pantoprazole  40 mg Oral QAM AC    ipratropium 0.5 mg-albuterol 2.5 mg  1 Dose Inhalation TID    predniSONE  30 mg Oral Daily    aspirin  81 mg Oral Daily    oseltamivir  75 mg Oral BID    dolutegravir sodium  50 mg Oral Daily    emtricitabine-tenofovir  1 tablet Oral Daily    sodium chloride flush  5-40 mL IntraVENous 2 times per day       Continuous Infusions:   sodium chloride      heparin (PORCINE) Infusion 11 Units/kg/hr (02/08/25 0159)       PRN Meds:  OLANZapine **OR** OLANZapine, albuterol, sodium chloride flush, sodium chloride, potassium  hyperkinetic. Grade I diastolic dysfunction with normal LAP. No thrombus present.      Hypomagnesemia  - 1.6--1.89  -Replace magnesium as needed    HIV  - On HIV medications prior to admission   - ID consulted, appreciate recommendations   - continue current meds     Schizoaffective disorder  - on Invega monthly   - confused, agitated. Psychiatry consulted     History of seizures  - monitor        Note above makes patient higher risk for morbidity and mortality requiring testing and treatment.      DVT Prophylaxis: heparin gtt  Diet: ADULT DIET; Regular; Low Sodium (2 gm); 2000 ml  Code Status: Full Code    Iva Patten, DO

## 2025-02-09 LAB
ANTI-XA UNFRAC HEPARIN: 0.59 IU/ML (ref 0.3–0.7)
BACTERIA BLD CULT ORG #2: NORMAL

## 2025-02-09 PROCEDURE — 99233 SBSQ HOSP IP/OBS HIGH 50: CPT | Performed by: INTERNAL MEDICINE

## 2025-02-09 PROCEDURE — 94640 AIRWAY INHALATION TREATMENT: CPT

## 2025-02-09 PROCEDURE — 6370000000 HC RX 637 (ALT 250 FOR IP): Performed by: INTERNAL MEDICINE

## 2025-02-09 PROCEDURE — 2060000000 HC ICU INTERMEDIATE R&B

## 2025-02-09 PROCEDURE — 99232 SBSQ HOSP IP/OBS MODERATE 35: CPT | Performed by: STUDENT IN AN ORGANIZED HEALTH CARE EDUCATION/TRAINING PROGRAM

## 2025-02-09 PROCEDURE — 2500000003 HC RX 250 WO HCPCS: Performed by: STUDENT IN AN ORGANIZED HEALTH CARE EDUCATION/TRAINING PROGRAM

## 2025-02-09 PROCEDURE — 85520 HEPARIN ASSAY: CPT

## 2025-02-09 PROCEDURE — 6370000000 HC RX 637 (ALT 250 FOR IP): Performed by: STUDENT IN AN ORGANIZED HEALTH CARE EDUCATION/TRAINING PROGRAM

## 2025-02-09 PROCEDURE — 36415 COLL VENOUS BLD VENIPUNCTURE: CPT

## 2025-02-09 PROCEDURE — 6360000002 HC RX W HCPCS: Performed by: INTERNAL MEDICINE

## 2025-02-09 PROCEDURE — 94761 N-INVAS EAR/PLS OXIMETRY MLT: CPT

## 2025-02-09 RX ORDER — FUROSEMIDE 40 MG/1
40 TABLET ORAL DAILY
Status: DISCONTINUED | OUTPATIENT
Start: 2025-02-09 | End: 2025-02-09

## 2025-02-09 RX ORDER — ATORVASTATIN CALCIUM 40 MG/1
40 TABLET, FILM COATED ORAL NIGHTLY
Status: DISCONTINUED | OUTPATIENT
Start: 2025-02-09 | End: 2025-02-10 | Stop reason: HOSPADM

## 2025-02-09 RX ORDER — ENOXAPARIN SODIUM 100 MG/ML
1 INJECTION SUBCUTANEOUS 2 TIMES DAILY
Status: DISCONTINUED | OUTPATIENT
Start: 2025-02-09 | End: 2025-02-10

## 2025-02-09 RX ORDER — FUROSEMIDE 40 MG/1
40 TABLET ORAL DAILY
Status: DISCONTINUED | OUTPATIENT
Start: 2025-02-10 | End: 2025-02-10 | Stop reason: HOSPADM

## 2025-02-09 RX ADMIN — PREDNISONE 30 MG: 20 TABLET ORAL at 09:01

## 2025-02-09 RX ADMIN — DOLUTEGRAVIR SODIUM 50 MG: 50 TABLET, FILM COATED ORAL at 09:01

## 2025-02-09 RX ADMIN — IPRATROPIUM BROMIDE AND ALBUTEROL SULFATE 1 DOSE: .5; 2.5 SOLUTION RESPIRATORY (INHALATION) at 21:10

## 2025-02-09 RX ADMIN — ATORVASTATIN CALCIUM 40 MG: 40 TABLET, FILM COATED ORAL at 20:40

## 2025-02-09 RX ADMIN — ENOXAPARIN SODIUM 80 MG: 100 INJECTION SUBCUTANEOUS at 20:39

## 2025-02-09 RX ADMIN — LOSARTAN POTASSIUM 25 MG: 25 TABLET, FILM COATED ORAL at 09:01

## 2025-02-09 RX ADMIN — Medication 10 ML: at 20:40

## 2025-02-09 RX ADMIN — OSELTAMIVIR PHOSPHATE 75 MG: 75 CAPSULE ORAL at 20:40

## 2025-02-09 RX ADMIN — IPRATROPIUM BROMIDE AND ALBUTEROL SULFATE 1 DOSE: .5; 2.5 SOLUTION RESPIRATORY (INHALATION) at 07:53

## 2025-02-09 RX ADMIN — ASPIRIN 81 MG: 81 TABLET, COATED ORAL at 09:01

## 2025-02-09 RX ADMIN — EMTRICITABINE AND TENOFOVIR DISOPROXIL FUMARATE 1 TABLET: 200; 300 TABLET, FILM COATED ORAL at 09:01

## 2025-02-09 RX ADMIN — SPIRONOLACTONE 25 MG: 25 TABLET ORAL at 09:01

## 2025-02-09 RX ADMIN — IPRATROPIUM BROMIDE AND ALBUTEROL SULFATE 1 DOSE: .5; 2.5 SOLUTION RESPIRATORY (INHALATION) at 13:56

## 2025-02-09 RX ADMIN — OSELTAMIVIR PHOSPHATE 75 MG: 75 CAPSULE ORAL at 09:01

## 2025-02-09 NOTE — PLAN OF CARE
Problem: Chronic Conditions and Co-morbidities  Goal: Patient's chronic conditions and co-morbidity symptoms are monitored and maintained or improved  Outcome: Progressing  Flowsheets (Taken 2/9/2025 0927)  Care Plan - Patient's Chronic Conditions and Co-Morbidity Symptoms are Monitored and Maintained or Improved: Monitor and assess patient's chronic conditions and comorbid symptoms for stability, deterioration, or improvement     Problem: Discharge Planning  Goal: Discharge to home or other facility with appropriate resources  Outcome: Progressing  Flowsheets (Taken 2/9/2025 0927)  Discharge to home or other facility with appropriate resources:   Identify barriers to discharge with patient and caregiver   Arrange for needed discharge resources and transportation as appropriate   Identify discharge learning needs (meds, wound care, etc)     Problem: Skin/Tissue Integrity  Goal: Skin integrity remains intact  Description: 1.  Monitor for areas of redness and/or skin breakdown  2.  Assess vascular access sites hourly  3.  Every 4-6 hours minimum:  Change oxygen saturation probe site  4.  Every 4-6 hours:  If on nasal continuous positive airway pressure, respiratory therapy assess nares and determine need for appliance change or resting period  Outcome: Progressing  Flowsheets (Taken 2/9/2025 0927)  Skin Integrity Remains Intact: Monitor for areas of redness and/or skin breakdown     Problem: Safety - Adult  Goal: Free from fall injury  Outcome: Progressing     Problem: ABCDS Injury Assessment  Goal: Absence of physical injury  Outcome: Progressing     Problem: Safety - Medical Restraint  Goal: Remains free of injury from restraints (Restraint for Interference with Medical Device)  Description: INTERVENTIONS:  1. Determine that other, less restrictive measures have been tried or would not be effective before applying the restraint  2. Evaluate the patient's condition at the time of restraint application  3. Inform  patient/family regarding the reason for restraint  4. Q2H: Monitor safety, psychosocial status, comfort, nutrition and hydration  Outcome: Progressing

## 2025-02-09 NOTE — PROGRESS NOTES
RT Inhaler-Nebulizer Bronchodilator Protocol Note    There is a bronchodilator order in the chart from a provider indicating to follow the RT Bronchodilator Protocol and there is an “Initiate RT Inhaler-Nebulizer Bronchodilator Protocol” order as well (see protocol at bottom of note).    CXR Findings:  No results found.    The findings from the last RT Protocol Assessment were as follows:   History Pulmonary Disease: (P) Smoker 15 pack years or more  Respiratory Pattern: (P) Dyspnea on exertion or RR 21-25 bpm  Breath Sounds: (P) Slightly diminished and/or crackles  Cough: (P) Weak, productive  Indication for Bronchodilator Therapy: (P) Decreased or absent breath sounds  Bronchodilator Assessment Score: (P) 7    Aerosolized bronchodilator medication orders have been revised according to the RT Inhaler-Nebulizer Bronchodilator Protocol below.    Respiratory Therapist to perform RT Therapy Protocol Assessment initially then follow the protocol.  Repeat RT Therapy Protocol Assessment PRN for score 0-3 or on second treatment, BID, and PRN for scores above 3.    No Indications - adjust the frequency to every 6 hours PRN wheezing or bronchospasm, if no treatments needed after 48 hours then discontinue using Per Protocol order mode.     If indication present, adjust the RT bronchodilator orders based on the Bronchodilator Assessment Score as indicated below.  Use Inhaler orders unless patient has one or more of the following: on home nebulizer, not able to hold breath for 10 seconds, is not alert and oriented, cannot activate and use MDI correctly, or respiratory rate 25 breaths per minute or more, then use the equivalent nebulizer order(s) with same Frequency and PRN reasons based on the score.  If a patient is on this medication at home then do not decrease Frequency below that used at home.    0-3 - enter or revise RT bronchodilator order(s) to equivalent RT Bronchodilator order with Frequency of every 4 hours PRN for

## 2025-02-09 NOTE — PROGRESS NOTES
Bedside report and transfer of care given to NICOLE Lilly. Pt currently resting in bed with the call light within reach. Pt denies any other care needs at this time. Pt stable at this time.    Cindy Valencia RN

## 2025-02-09 NOTE — PROGRESS NOTES
CARDIOLOGY Progress Note        Patient Name: Jeremie Gloria  Date of admission: 2/5/2025  6:52 PM  Admission Dx: Septicemia ([facility]) [A41.9]  Prolonged QT interval [R94.31]  NSTEMI (non-ST elevated myocardial infarction) ([facility]) [I21.4]  Closed head injury, initial encounter [S09.90XA]  Sepsis ([facility]) [A41.9]  Requesting Physician: Fabián Enamorado MD  Primary Care physician: Natalie Le MD    Reason for Consultation/Chief Complaint: \"NSTEMI\"    Subjective:  Patient lying flat.  Opens eyes but goes back to bed.  He is able to tell me no chest pain or shortness of breath.  Telemetry sinus rhythm 70s, while sleeping sinus bradycardia 40s    History of Present Illness:     Jeremie Gloria is a 79 y.o. patient with past medical history of HIV, dementia, schizoaffective disorder, GERD, hypertension, history of seizures who presented to the hospital with complaints of unwitnessed fall at his extended care facility.  Patient reportedly lost his balance while ambulating with his walker.  Patient reports shortness of breath.  Reports wheezing and cough.  Denies any chest pain.  Denies lower extremity edema.  Denies palpitations.    Presenting EKG sinus tachycardia with a rate of 112 bpm, possible right atrial enlargement, inferior infarct cited on or before October 6, 2023, possible anterolateral infarct cited on or before October 6, 2023, nonspecific ST abnormalities, prolonged QTc 647 ms.  Repeat ECG significant artifact but overall similar aside from faster rates with sinus tachycardia 127 bpm.  ECG today sinus tachycardia 115 bpm, QTc 630 ms nonspecific ST abnormalities.  Otherwise similar to priors.    CT head and CT C-spine without acute abnormalities.    Past Medical History:   has a past medical history of Depression, Diabetes mellitus ([facility]), Erectile dysfunction, High triglycerides, HIV (human immunodeficiency virus infection) ([facility]), Hyperlipidemia, Hypertension, Idiopathic gout, Intermittent confusion, Muscle    Component Value Date    CHOL 138 02/06/2025    CHOL 180 06/15/2015    CHOL 183 04/30/2014     Lab Results   Component Value Date    TRIG 39 02/06/2025    TRIG 350 (H) 06/15/2015    TRIG 403 (H) 04/30/2014     Lab Results   Component Value Date    HDL 31 (L) 02/06/2025    HDL 32 (L) 06/15/2015    HDL 32 (L) 04/30/2014     No components found for: \"LDLCHOLESTEROL\", \"LDLCALC\"  Lab Results   Component Value Date    VLDL 8 02/06/2025    VLDL see below 06/15/2015    VLDL see below 04/30/2014     No results found for: \"CHOLHDLRATIO\"     Cardiac Data:     EKG: Presenting EKG sinus tachycardia with a rate of 112 bpm, possible right atrial enlargement, inferior infarct cited on or before October 6, 2023, possible anterolateral infarct cited on or before October 6, 2023, nonspecific ST abnormalities, prolonged QTc 647 ms.  Repeat ECG significant artifact but overall similar aside from faster rates with sinus tachycardia 127 bpm.  ECG today sinus tachycardia 115 bpm, QTc 630 ms nonspecific ST abnormalities.  Otherwise similar to priors.      TTE 2/6/25  Left Ventricle: Moderate-severely reduced left ventricular systolic function with a visually estimated EF of 30 - 35%. Left ventricle size is normal. Sigmoid septal thickening. Akinesis of the following segments: mid inferolateral, apical anterior, apical lateral, apical septal and apical inferior. Hypokinesis of the following segments: mid anteroseptal and mid inferoseptal. The basal anteroseptal wall is hyperkinetic. Grade I diastolic dysfunction with normal LAP. No thrombus present.    Right Ventricle: Right ventricle size is normal. Normal systolic function.    Aortic Valve: Trileaflet valve. Sclerosis of the aortic valve cusps. No restricted motion. No regurgitation. No stenosis.    Tricuspid Valve: Unable to assess RVSP.    IVC/SVC: IVC diameter is greater than 21 mm and decreases less than 50% during inspiration; therefore the estimated right atrial pressure is elevated

## 2025-02-09 NOTE — FLOWSHEET NOTE
02/09/25 1438   Vital Signs   Temp 98 °F (36.7 °C)   Temp Source Oral   Pulse 71   Heart Rate Source Monitor   Respirations 18   /68   MAP (Calculated) 83   BP Location Right upper arm   BP Method Automatic   Patient Position High fowlers   Oxygen Therapy   SpO2 95 %   O2 Device None (Room air)     Vitals and reassessment completed. No significant changes. No further needs at this time.      Cindy Valencia RN

## 2025-02-09 NOTE — PROGRESS NOTES
2/9  Anti-Xa = 0.59 at 0528.  Continue heparin gtt at 7 units/kg/hr.  Check Anti-Xa daily.  Kike Young PharmMOLLY  2/9/2025 6:16 AM

## 2025-02-09 NOTE — FLOWSHEET NOTE
02/09/25 0859   Vital Signs   Temp 98.2 °F (36.8 °C)   Temp Source Oral   Pulse 66   Heart Rate Source Monitor   Respirations 19   /68   MAP (Calculated) 84   BP Location Right upper arm   BP Method Automatic   Patient Position Sitting   Oxygen Therapy   SpO2 95 %   O2 Device None (Room air)     Vitals and assessment completed. No s/s of distress. Medications given per MAR without complication. Patient resting in bed, denies any needs at this time.     Cindy Valencia RN

## 2025-02-09 NOTE — PROGRESS NOTES
02/08/25 1900   RT Protocol   History Pulmonary Disease 1   Respiratory pattern 2   Breath sounds 4   Cough 0   Indications for Bronchodilator Therapy Wheezing associated with pulm disorder   Bronchodilator Assessment Score 7     RT Inhaler-Nebulizer Bronchodilator Protocol Note    There is a bronchodilator order in the chart from a provider indicating to follow the RT Bronchodilator Protocol and there is an “Initiate RT Inhaler-Nebulizer Bronchodilator Protocol” order as well (see protocol at bottom of note).    CXR Findings:  No results found.    The findings from the last RT Protocol Assessment were as follows:   History Pulmonary Disease: Smoker 15 pack years or more  Respiratory Pattern: Dyspnea on exertion or RR 21-25 bpm  Breath Sounds: Intermittent or unilateral wheezes  Cough: Strong, spontaneous, non-productive  Indication for Bronchodilator Therapy: Wheezing associated with pulm disorder  Bronchodilator Assessment Score: 7    Aerosolized bronchodilator medication orders have been revised according to the RT Inhaler-Nebulizer Bronchodilator Protocol below.    Respiratory Therapist to perform RT Therapy Protocol Assessment initially then follow the protocol.  Repeat RT Therapy Protocol Assessment PRN for score 0-3 or on second treatment, BID, and PRN for scores above 3.    No Indications - adjust the frequency to every 6 hours PRN wheezing or bronchospasm, if no treatments needed after 48 hours then discontinue using Per Protocol order mode.     If indication present, adjust the RT bronchodilator orders based on the Bronchodilator Assessment Score as indicated below.  Use Inhaler orders unless patient has one or more of the following: on home nebulizer, not able to hold breath for 10 seconds, is not alert and oriented, cannot activate and use MDI correctly, or respiratory rate 25 breaths per minute or more, then use the equivalent nebulizer order(s) with same Frequency and PRN reasons based on the score.

## 2025-02-09 NOTE — PROGRESS NOTES
2/8  Anti-Xa = 0.59 at 2309.  Continue heparin gtt at 7 units/kg/hr,  Recheck Anti-Xa in 6 hours.  Kike Young PharmD  2/8/2025 11:54 PM

## 2025-02-09 NOTE — PROGRESS NOTES
Patient resting in bed, patient is pink, warm, and dry. Respirations E/E on room air. Iv site unremarkable. No S/S of acute distress noted. Head to toe completed at this time. Patient is A/O to self. Tele monitor in room. Medication given patient tolerated well. Call light in easy reach, patient reminded to use call light for needs and assistance. Bed locked and in lowest position side rails up x2.

## 2025-02-09 NOTE — PROGRESS NOTES
Progress Note    Admit Date:  2/5/2025      Admitted for shortness of breath, +PE on CTPA, started on Heparin gtt, NSTEMI, cardiology, pulmonary and ID consulted  Hx of HIV     Subjective:  Mr. Gloria seen at bedside. He denies complaints.     Objective:   Patient Vitals for the past 4 hrs:   BP Temp Temp src Pulse Resp SpO2   02/09/25 0446 (!) 100/58 97.9 °F (36.6 °C) Axillary 58 18 94 %          Intake/Output Summary (Last 24 hours) at 2/9/2025 0752  Last data filed at 2/9/2025 0446  Gross per 24 hour   Intake 666 ml   Output 600 ml   Net 66 ml       Physical Exam:    Gen: No distress. Alert.   Eyes: PERRL. No sclera icterus. No conjunctival injection.   ENT: No discharge. Pharynx clear.   Neck: No JVD.  Trachea midline.  Resp: Mild increased wob, wheezing bilaterally  CV: Regular rate. Regular rhythm. No murmur.  No rub. No edema.   Capillary Refill: Brisk,< 3 seconds   Peripheral Pulses: +2 palpable, equal bilaterally   GI: Non-tender. Non-distended.  Normal bowel sounds.  Skin: Warm and dry. No nodule on exposed extremities. No rash on exposed extremities.   M/S: No cyanosis. No joint deformity. No clubbing.   Neuro: Awake. Grossly nonfocal    Psych: No agitation when seen    Scheduled Meds:   spironolactone  25 mg Oral Daily    spironolactone  12.5 mg Oral Once    losartan  25 mg Oral Daily    metoprolol succinate  25 mg Oral Daily    pantoprazole  40 mg Oral QAM AC    ipratropium 0.5 mg-albuterol 2.5 mg  1 Dose Inhalation TID    predniSONE  30 mg Oral Daily    aspirin  81 mg Oral Daily    oseltamivir  75 mg Oral BID    dolutegravir sodium  50 mg Oral Daily    emtricitabine-tenofovir  1 tablet Oral Daily    sodium chloride flush  5-40 mL IntraVENous 2 times per day       Continuous Infusions:   heparin (PORCINE) Infusion 7 Units/kg/hr (02/08/25 1643)    sodium chloride         PRN Meds:  OLANZapine **OR** OLANZapine, albuterol, sodium chloride flush, sodium chloride, potassium chloride **OR** potassium  On HIV medications prior to admission   - ID consulted, appreciate recommendations   - continue current meds     Schizoaffective disorder  - on Invega monthly   - confused, agitated. Psychiatry consulted     History of seizures  - monitor        Note above makes patient higher risk for morbidity and mortality requiring testing and treatment.      DVT Prophylaxis: heparin gtt -> lovenox  Diet: ADULT DIET; Regular; Low Sodium (2 gm); 2000 ml  Code Status: Full Code    Iva Patten,

## 2025-02-09 NOTE — FLOWSHEET NOTE
02/09/25 0009   Vital Signs   Temp 97.9 °F (36.6 °C)   Temp Source Axillary   Pulse 63   Heart Rate Source Monitor   Respirations 18   /80   MAP (Calculated) 97   BP Location Right upper arm   BP Method Automatic   Patient Position Right side   Oxygen Therapy   SpO2 96 %   O2 Device None (Room air)     Patient resting in bed, no S/S of acute distress noted. Call light in easy reach.

## 2025-02-09 NOTE — PROGRESS NOTES
Bedside report and transfer of care given to Cindy Del Rosario. Pt currently resting in bed with the call light within reach. Pt denies any other care needs at this time. Pt stable at this time.

## 2025-02-10 VITALS
DIASTOLIC BLOOD PRESSURE: 80 MMHG | WEIGHT: 181 LBS | TEMPERATURE: 98 F | RESPIRATION RATE: 20 BRPM | HEART RATE: 70 BPM | SYSTOLIC BLOOD PRESSURE: 122 MMHG | OXYGEN SATURATION: 97 % | BODY MASS INDEX: 27.43 KG/M2 | HEIGHT: 68 IN

## 2025-02-10 LAB
ANION GAP SERPL CALCULATED.3IONS-SCNC: 8 MMOL/L (ref 3–16)
BACTERIA BLD CULT: NORMAL
BASOPHILS # BLD: 0 K/UL (ref 0–0.2)
BASOPHILS NFR BLD: 0.1 %
BUN SERPL-MCNC: 21 MG/DL (ref 7–20)
CALCIUM SERPL-MCNC: 8.1 MG/DL (ref 8.3–10.6)
CHLORIDE SERPL-SCNC: 106 MMOL/L (ref 99–110)
CO2 SERPL-SCNC: 28 MMOL/L (ref 21–32)
CREAT SERPL-MCNC: 0.9 MG/DL (ref 0.8–1.3)
DEPRECATED RDW RBC AUTO: 13.2 % (ref 12.4–15.4)
EOSINOPHIL # BLD: 0 K/UL (ref 0–0.6)
EOSINOPHIL NFR BLD: 0.1 %
GFR SERPLBLD CREATININE-BSD FMLA CKD-EPI: 88 ML/MIN/{1.73_M2}
GLUCOSE SERPL-MCNC: 76 MG/DL (ref 70–99)
HCT VFR BLD AUTO: 34.5 % (ref 40.5–52.5)
HGB BLD-MCNC: 12 G/DL (ref 13.5–17.5)
LYMPHOCYTES # BLD: 1.9 K/UL (ref 1–5.1)
LYMPHOCYTES NFR BLD: 26.3 %
MCH RBC QN AUTO: 34.6 PG (ref 26–34)
MCHC RBC AUTO-ENTMCNC: 34.8 G/DL (ref 31–36)
MCV RBC AUTO: 99.4 FL (ref 80–100)
MONOCYTES # BLD: 0.7 K/UL (ref 0–1.3)
MONOCYTES NFR BLD: 9.5 %
NEUTROPHILS # BLD: 4.7 K/UL (ref 1.7–7.7)
NEUTROPHILS NFR BLD: 64 %
PLATELET # BLD AUTO: 157 K/UL (ref 135–450)
PMV BLD AUTO: 7.7 FL (ref 5–10.5)
POTASSIUM SERPL-SCNC: 3.7 MMOL/L (ref 3.5–5.1)
RBC # BLD AUTO: 3.47 M/UL (ref 4.2–5.9)
SODIUM SERPL-SCNC: 142 MMOL/L (ref 136–145)
WBC # BLD AUTO: 7.3 K/UL (ref 4–11)

## 2025-02-10 PROCEDURE — 36415 COLL VENOUS BLD VENIPUNCTURE: CPT

## 2025-02-10 PROCEDURE — 99239 HOSP IP/OBS DSCHRG MGMT >30: CPT | Performed by: INTERNAL MEDICINE

## 2025-02-10 PROCEDURE — 94761 N-INVAS EAR/PLS OXIMETRY MLT: CPT

## 2025-02-10 PROCEDURE — 85025 COMPLETE CBC W/AUTO DIFF WBC: CPT

## 2025-02-10 PROCEDURE — 80048 BASIC METABOLIC PNL TOTAL CA: CPT

## 2025-02-10 PROCEDURE — 2500000003 HC RX 250 WO HCPCS: Performed by: STUDENT IN AN ORGANIZED HEALTH CARE EDUCATION/TRAINING PROGRAM

## 2025-02-10 PROCEDURE — 6370000000 HC RX 637 (ALT 250 FOR IP): Performed by: INTERNAL MEDICINE

## 2025-02-10 PROCEDURE — 94640 AIRWAY INHALATION TREATMENT: CPT

## 2025-02-10 PROCEDURE — 6360000002 HC RX W HCPCS: Performed by: INTERNAL MEDICINE

## 2025-02-10 PROCEDURE — 6370000000 HC RX 637 (ALT 250 FOR IP): Performed by: STUDENT IN AN ORGANIZED HEALTH CARE EDUCATION/TRAINING PROGRAM

## 2025-02-10 RX ORDER — OSELTAMIVIR PHOSPHATE 75 MG/1
75 CAPSULE ORAL 2 TIMES DAILY
Qty: 2 CAPSULE | Refills: 0 | Status: SHIPPED | OUTPATIENT
Start: 2025-02-10 | End: 2025-02-11

## 2025-02-10 RX ORDER — METOPROLOL SUCCINATE 25 MG/1
25 TABLET, EXTENDED RELEASE ORAL DAILY
Qty: 30 TABLET | Refills: 3 | Status: SHIPPED | OUTPATIENT
Start: 2025-02-11

## 2025-02-10 RX ORDER — ASPIRIN 81 MG/1
81 TABLET ORAL DAILY
Qty: 30 TABLET | Refills: 3 | Status: SHIPPED | OUTPATIENT
Start: 2025-02-11

## 2025-02-10 RX ORDER — SPIRONOLACTONE 25 MG/1
25 TABLET ORAL DAILY
Qty: 30 TABLET | Refills: 3 | Status: SHIPPED | OUTPATIENT
Start: 2025-02-11

## 2025-02-10 RX ORDER — ATORVASTATIN CALCIUM 40 MG/1
40 TABLET, FILM COATED ORAL NIGHTLY
Qty: 30 TABLET | Refills: 3 | Status: SHIPPED | OUTPATIENT
Start: 2025-02-10

## 2025-02-10 RX ORDER — FUROSEMIDE 40 MG/1
40 TABLET ORAL DAILY
Qty: 60 TABLET | Refills: 3 | Status: SHIPPED | OUTPATIENT
Start: 2025-02-11

## 2025-02-10 RX ORDER — LOSARTAN POTASSIUM 25 MG/1
25 TABLET ORAL DAILY
Qty: 30 TABLET | Refills: 3 | Status: SHIPPED | OUTPATIENT
Start: 2025-02-11

## 2025-02-10 RX ADMIN — IPRATROPIUM BROMIDE AND ALBUTEROL SULFATE 1 DOSE: .5; 2.5 SOLUTION RESPIRATORY (INHALATION) at 07:22

## 2025-02-10 RX ADMIN — IPRATROPIUM BROMIDE AND ALBUTEROL SULFATE 1 DOSE: .5; 2.5 SOLUTION RESPIRATORY (INHALATION) at 13:38

## 2025-02-10 RX ADMIN — LOSARTAN POTASSIUM 25 MG: 25 TABLET, FILM COATED ORAL at 08:36

## 2025-02-10 RX ADMIN — DOLUTEGRAVIR SODIUM 50 MG: 50 TABLET, FILM COATED ORAL at 08:36

## 2025-02-10 RX ADMIN — METOPROLOL SUCCINATE 25 MG: 25 TABLET, EXTENDED RELEASE ORAL at 08:36

## 2025-02-10 RX ADMIN — ASPIRIN 81 MG: 81 TABLET, COATED ORAL at 08:36

## 2025-02-10 RX ADMIN — SPIRONOLACTONE 25 MG: 25 TABLET ORAL at 08:36

## 2025-02-10 RX ADMIN — EMTRICITABINE AND TENOFOVIR DISOPROXIL FUMARATE 1 TABLET: 200; 300 TABLET, FILM COATED ORAL at 08:36

## 2025-02-10 RX ADMIN — OSELTAMIVIR PHOSPHATE 75 MG: 75 CAPSULE ORAL at 08:36

## 2025-02-10 RX ADMIN — ENOXAPARIN SODIUM 80 MG: 100 INJECTION SUBCUTANEOUS at 08:39

## 2025-02-10 RX ADMIN — PANTOPRAZOLE SODIUM 40 MG: 40 TABLET, DELAYED RELEASE ORAL at 05:40

## 2025-02-10 RX ADMIN — FUROSEMIDE 40 MG: 40 TABLET ORAL at 08:36

## 2025-02-10 RX ADMIN — Medication 10 ML: at 08:36

## 2025-02-10 NOTE — DISCHARGE SUMMARY
Name:  Jeremie Gloria  Room:  /0308-01  MRN:    [id number]    IM Discharge Summary    Discharging Physician:  Ravinder castaneda MD    Admit: 2/5/2025    Discharge:      PCP      Natalie Le MD    Diagnoses and hospital course  this Admission           Sepsis  - Source:, possible viral   - Criteria: (+) fever, (+) HR, (+) RR, (+) WBC, (+) LA  - Admit to PCU  - Blood & Resp cx done and were neg   - pt is immunocompromised with HIV   - respiratory panel showed influenza H1 virus  - started on Vancomycin and Cefepime on admission - now stopped..   - ID consulted    - started on Tamiflu  - improved vitals     Acute hypoxic respiratory failure   Acute PE   secondary to viral infection and acute  PE  -CTA chest showed acute PE, venous doppler neg for dVt  - started on heparin gtt  - pulmonary consulted  - echo with low EF and grade 1 DD   - hemodynamically stable and on RA  - changed to lovenox >ELiquis      NSTEMI  Prolonged Qtc  Ischemic Cardiomyopathy      - troponin [id number]  - ECho with new low EF of Moderate-severely reduced left ventricular systolic function with a visually estimated EF of 30 - 35%. Grade 1 DD and hypokinesis     - Cardiology consulted  - started aspirin, statin, BB , low dose ARB  - Started heparin drip -  - Telemetry monitoring.   - added lasix and aldactone now for CHF - need outpt fw      Hypomagnesemia  Replaced as needed     HIV  - On HIV medications- TIVICAY and TRUVADA   - ID consulted, appreciate recommendations   - continue current meds     Schizoaffective disorder  - on Invega monthly   - confused, agitated. Psychiatry consulted     History of seizures  -stable with no meds on board         HPI   79 y.o. male who presented to [facility] with an unwitnessed fall from his ECF after losing balance while ambulating with his walker.  PMHx significant for HIV, schizoaffective disorder, dementia, GERD, hypertension, history of seizures.       Patient seen at bedside today.  He

## 2025-02-10 NOTE — PROGRESS NOTES
intracranial abnormality.   2. No acute osseous abnormality of the cervical spine. Multilevel   degenerative disc disease and facet arthrosis.            Dopplers      No evidence of deep vein or superficial vein thrombosis in the bilateral lower extremities.     ECHO 2025      Left Ventricle: Moderate-severely reduced left ventricular systolic function with a visually estimated EF of 30 - 35%. Left ventricle size is normal. Sigmoid septal thickening. Akinesis of the following segments: mid inferolateral, apical anterior, apical lateral, apical septal and apical inferior. Hypokinesis of the following segments: mid anteroseptal and mid inferoseptal. The basal anteroseptal wall is hyperkinetic. Grade I diastolic dysfunction with normal LAP. No thrombus present.    Right Ventricle: Right ventricle size is normal. Normal systolic function.    Aortic Valve: Trileaflet valve. Sclerosis of the aortic valve cusps. No restricted motion. No regurgitation. No stenosis.    Tricuspid Valve: Unable to assess RVSP.    IVC/SVC: IVC diameter is greater than 21 mm and decreases less than 50% during inspiration; therefore the estimated right atrial pressure is elevated (~15 mmHg). IVC is dilated.    Image quality is technically difficult. Contrast used: Definity. Technically difficult study.    Assessment/Plan:    Sepsis  - Source:, possible viral   - Criteria: (+) fever, (+) HR, (+) RR, (+) WBC, (+) LA  - Admit to PCU  - Blood & Resp cx done and were neg   - pt is immunocompromised with HIV   - respiratory panel showed influenza H1 virus  - started on Vancomycin and Cefepime on admission - now stopped..   - ID consulted    - started on Tamiflu  - improved vitals    Acute hypoxic respiratory failure   Acute PE   secondary to viral infection and acute  PE  -CTA chest showed acute PE, venous doppler neg for dVt  - started on heparin gtt  - pulmonary consulted  - echo with low EF and grade 1 DD   - hemodynamically stable and on RA  -  changed to lovenox >ELiquis      NSTEMI  Prolonged Qtc  Ischemic Cardiomyopathy     - troponin [id number]--776  - ECho with new low EF of Moderate-severely reduced left ventricular systolic function with a visually estimated EF of 30 - 35%. Grade 1 DD and hypokinesis    - Cardiology consulted  - started aspirin, statin, BB , low dose ARB  - Started heparin drip -  - Telemetry monitoring.   - added lasix and aldactone now for CHF - need outpt fw      Hypomagnesemia  Replaced as needed    HIV  - On HIV medications- TIVICAY and TRUVADA   - ID consulted, appreciate recommendations   - continue current meds     Schizoaffective disorder  - on Invega monthly   - confused, agitated. Psychiatry consulted     History of seizures  -stable with no meds on board       Note above makes patient higher risk for morbidity and mortality requiring testing and treatment.      DVT Prophylaxis: heparin gtt -> lovenox>eliquis   Diet: ADULT DIET; Regular; Low Sodium (2 gm); 2000 ml  Code Status: Full Code    Dc planning    Ravinder Cordova MD, 2/10/2025 7:36 AM

## 2025-02-10 NOTE — CONSULTS
Writer ROSE for Arjun related to questions related to palliative care that he called about per RUFINA Sims Awaiting call back. Following.

## 2025-02-10 NOTE — PLAN OF CARE
HEART FAILURE CARE PLAN:    Comorbidities Reviewed: Yes   Patient has a past medical history of Depression, Diabetes mellitus (HCC), Erectile dysfunction, High triglycerides, HIV (human immunodeficiency virus infection) (HCC), Hyperlipidemia, Hypertension, Idiopathic gout, Intermittent confusion, Muscle weakness, Osteoarthritis, Other specified personal history presenting hazards to health(V15.89), Schizophrenia (HCC), Seizures (HCC), and Vitamin D deficiency.     Weights Reviewed: Yes   Admission weight: 79.4 kg (175 lb)   Wt Readings from Last 3 Encounters:   02/06/25 82.1 kg (181 lb)   11/12/24 79.4 kg (175 lb)   09/30/19 78 kg (172 lb)     Intake & Output Reviewed: Yes     Intake/Output Summary (Last 24 hours) at 2/10/2025 0853  Last data filed at 2/9/2025 1648  Gross per 24 hour   Intake 444 ml   Output 300 ml   Net 144 ml       ECHOCARDIOGRAM Reviewed: Yes   Patient's Ejection Fraction (EF) is less than or equal to 40%. Discuss HFrEF Guideline Directed Medical Therapy (GDMT) with Cardiologist or Hospitalist:          Medications Reviewed: Yes   SCHEDULED HOSPITAL MEDICATIONS:   furosemide  40 mg Oral Daily    enoxaparin  1 mg/kg SubCUTAneous BID    atorvastatin  40 mg Oral Nightly    spironolactone  25 mg Oral Daily    losartan  25 mg Oral Daily    metoprolol succinate  25 mg Oral Daily    pantoprazole  40 mg Oral QAM AC    ipratropium 0.5 mg-albuterol 2.5 mg  1 Dose Inhalation TID    aspirin  81 mg Oral Daily    oseltamivir  75 mg Oral BID    dolutegravir sodium  50 mg Oral Daily    emtricitabine-tenofovir  1 tablet Oral Daily    sodium chloride flush  5-40 mL IntraVENous 2 times per day     HOME MEDICATIONS:  Prior to Admission medications    Medication Sig Start Date End Date Taking? Authorizing Provider   vitamin D (CHOLECALCIFEROL) 80722 UNIT CAPS Take 1 capsule by mouth daily   Yes Provider, MD Roseann   paliperidone palmitate ER (INVEGA SUSTENNA) 234 MG/1.5ML ELIZABETH IM injection Inject 234 mg into the

## 2025-02-10 NOTE — DISCHARGE INSTR - COC
Continuity of Care Form    Patient Name: Jeremie Gloria   :  1945  MRN:  [id number]    Admit date:  2025  Discharge date:  02/10/25    Code Status Order: Limited   Advance Directives:   Advance Care Flowsheet Documentation             Admitting Physician:  Fabián Enamorado MD  PCP: Natalie Le MD    Discharging Nurse: NICOLE Luna  Discharging Hospital Unit/Room#: /0308-01  Discharging Unit Phone Number: [phone]    Emergency Contact:   Extended Emergency Contact Information  Primary Emergency Contact: rodo gloria  Mobile Phone: [phone]  Relation: Niece/Nephew  Secondary Emergency Contact: Arjun Gloria  Mobile Phone: [phone]  Relation: Legal Guardian    Past Surgical History:  Past Surgical History:   Procedure Laterality Date    EYE SURGERY Right 10/7/2019    LOWER LID ENTROPION REPAIR performed by Maya Ramos MD at [facility]    HEMORRHOID SURGERY         Immunization History:   Immunization History   Administered Date(s) Administered    Hepatitis A 2015, 2016    Influenza Virus Vaccine 2014, 2015    Meningococcal ACWY, MENACTRA (MenACWY-D), (age 9m-55y), IM, 0.5mL 2019    PPD Test 06/15/2015    Pneumococcal, PCV-13, PREVNAR 13, (age 6w+), IM, 0.5mL 2015    Pneumococcal, PPSV23, PNEUMOVAX 23, (age 2y+), SC/IM, 0.5mL 05/10/2017    TDaP, ADACEL (age 10y-64y), BOOSTRIX (age 10y+), IM, 0.5mL 2018       Active Problems:  Patient Active Problem List   Diagnosis Code    History of other specified conditions presenting hazards to health Z91.89    Depression F32.A    Schizophrenia (HCC) F20.9    Seizures (HCC) R56.9    Hyperlipidemia E78.5    ED (erectile dysfunction) N52.9    HIV (human immunodeficiency virus infection) (HCC) Z21    Gout M10.9    HTN (hypertension) I10    Schizoaffective disorder (HCC) F25.9    Psychosis (HCC) F29    Bipolar affective disorder, mixed, severe (HCC) F31.63    Cognitive disorder F09    Dementia (HCC)

## 2025-02-10 NOTE — CARE COORDINATION
12:07 PM  DCP plan remains to DC back to [facility]. Pt is now on RA. Per Palliative Care RN, pt's family will meet with [facility] once he returns to LTC.   ROMANA following.     ROMANA called and spoke with Registration to get pt's Legal Guardian listed on chart.

## 2025-02-10 NOTE — FLOWSHEET NOTE
02/10/25 0830   Vital Signs   Temp 98 °F (36.7 °C)   Temp Source Oral   Pulse 83   Heart Rate Source Monitor   Respirations 17   /82   MAP (Calculated) 98   BP Location Right upper arm   BP Method Automatic   Patient Position High fowlers     Assessment & vital signs completed. Morning meds given per MAR. Pt denies any further needs at this time. Call light within reach, pt is stable.

## 2025-02-10 NOTE — CONSULTS
[facility]  Palliative Medicine Consultation Note      Date Of Admission:2/5/2025  Date of consult: 02/10/25  Seen by PC in the past:  No    Recommendations:      Writer spoke with the pt's nephew and Legal Guardian, Arjun, via TC to introduce palliative/hospice options and had a voluntary discussion related to advance care planning. Per Arjun his goal is to have the pt return to [facility] and requests referral to [facility] to meet with the family once pt has returned to the LTC facility. Referral called to Mishel with [facility] and fs faxed.    Message sent to PALAK Sims related to above plan.    1. Goals of Care/Advanced Care planning/Code status: per Arjun requests to change code status to Limited x four no answers at this time and message sent to physician to change in epic.  2. Pain: managed per care team  3. SOB: denies, currently on RA  4. Disposition: return to LTC facility    Reason for Consult:         [x]  Goals of Care  [x]  Code Status Discussion/Advanced Care Planning   []  Psychosocial/Family Support  []  Symptom Management  []  Other (Specify)    Requesting Physician: Dr. Iva Patten    CHIEF COMPLAINT:  Septicemia    History Obtained From:  family member - Arjun, electronic medical record    History of Present Illness:         Jeremie Gloria is a 79 y.o. male with PMH of (see below list) who presented with Septicemia,Prolonged QT interval, NSTEMI, closed head injury. Pt reports unwitnessed fall at his ECF. Pt reports he lost his balance while ambulating with RW. Pt is in droplet precautions for influenza, +fever, +tachycardic. Pt is now on RA. Plan continues for the pt to return to LTC and family meeting with [facility].   Subjective:         Past Medical History:        Diagnosis Date    Depression     Diabetes mellitus (HCC)     Erectile dysfunction     High triglycerides     HIV (human immunodeficiency virus infection) (HCC)     Hyperlipidemia     Hypertension     Idiopathic gout     Intermittent

## 2025-02-10 NOTE — CARE COORDINATION
the hospital at discharge, or pharmacy can deliver to the bedside if staff is available. (payment due at time of pick-up or delivery - cash, check, or card accepted)     Able to afford home medications/ co-pay costs: Yes    ADLS:  Current PT AM-PAC Score:   /24  Current OT AM-PAC Score:   /24    DISCHARGE Disposition: Long Term Care Facility (LTC): [facility]  Phone: [phone]  Fax: [phone]    LOC at discharge: Long Term Care  JOHNATHON Completed: Yes    Notification completed in HENS/PAS?:  Not Applicable    IMM Completed:   Yes, Case management has presented and reviewed IMM letter #2 to the patient and/or family/ POA. Patient and/or family/POA verbalized understanding of their medicare rights and appeal process if needed. Patient and/or family/POA verbalized understanding of DC within 4 hours of signing IMM letter #2. Patient and/or family/POA has signed and placed today's date (2/10/25) and time (2:17 pm) on IMM letter #2 on the the appropriate lines. Patient and/or family/POA, provided copy of letter and they are aware that original copy of IMM letter #2 is available prior to discharge from the paper chart on the unit.  Electronic documentation has been entered into epic for IMM letter #2 and original paper copy has been added to the paper chart at the nurses station.         Transportation:  Transportation PLAN for discharge: EMS transportation   Mode of Transport: [facility]  Reason for medical transport: Bed confined: Meets the following criteria 1) unable to get out of bed without assistance or ambulate, 2) unable to safely sit up in a wheelchair, 3) unable to maintain erect seating position in a chair for time needed for transport  Name of Transport Company: [employer]  Phone: [phone]  Time of Transport: 4 pm    Transport form completed: Yes    Referrals made at DISCHARGE for outpatient continued care:  Not Applicable    Additional CM Notes: ROMANA observed DC order. ROMANA met  with pt at bedside. Pt is DC back to [facility] @ 4 pm via [employer]. Pt in agreement.   No further CM needs.   Pt's O2 is 97% on RA.     ROMANA informed RN of pt DC time and location. RN in agreement.     ROMANA called and spoke with Shyam @ [facility] and informed of pt DC time. Shyam in agreement.     ROMANA called and spoke with pt's Legal Guardian, Arjun, and informed of pt DC time and location. Arjun in agreement.       COVID Result:    Lab Results   Component Value Date/Time    COVID19 Not Detected 02/05/2025 07:44 PM       The Plan for Transition of Care is related to the following treatment goals of Septicemia (HCC) [A41.9]  Prolonged QT interval [R94.31]  NSTEMI (non-ST elevated myocardial infarction) (HCC) [I21.4]  Closed head injury, initial encounter [S09.90XA]  Sepsis (HCC) [A41.9]    Care Transitions patient: No    CHAPINCITO Bowman  Case Management Department

## 2025-02-10 NOTE — FLOWSHEET NOTE
02/09/25 2341   Vital Signs   Temp 98.4 °F (36.9 °C)   Temp Source Oral   Pulse 72   Heart Rate Source Monitor   Respirations 18   /69   MAP (Calculated) 84   BP Location Right upper arm   BP Method Automatic   Patient Position High fowlers   Oxygen Therapy   SpO2 96 %   O2 Device None (Room air)     Patient resting in bed. No S/S of acute distress noted. Call light in easy reach.

## 2025-02-10 NOTE — PROGRESS NOTES
Patient resting in bed, patient is pink, warm, and dry. Respirations E/E on room air. Iv site unremarkable. No S/S of acute distress noted. Head to toe completed at this time. Heparin drip stopped per MD order. Patient is alert to self. Medication given patient tolerated well. Call light in easy reach, patient reminded to use call light for needs and assistance. Bed locked and in lowest position side rails up x2.

## 2025-02-10 NOTE — PROGRESS NOTES
RT Inhaler-Nebulizer Bronchodilator Protocol Note    There is a bronchodilator order in the chart from a provider indicating to follow the RT Bronchodilator Protocol and there is an “Initiate RT Inhaler-Nebulizer Bronchodilator Protocol” order as well (see protocol at bottom of note).    CXR Findings:  No results found.    The findings from the last RT Protocol Assessment were as follows:   History Pulmonary Disease: (P) Smoker 15 pack years or more  Respiratory Pattern: (P) Dyspnea on exertion or RR 21-25 bpm  Breath Sounds: (P) Intermittent or unilateral wheezes  Cough: (P) Weak, productive  Indication for Bronchodilator Therapy: (P) Wheezing associated with pulm disorder  Bronchodilator Assessment Score: (P) 9    Aerosolized bronchodilator medication orders have been revised according to the RT Inhaler-Nebulizer Bronchodilator Protocol below.    Respiratory Therapist to perform RT Therapy Protocol Assessment initially then follow the protocol.  Repeat RT Therapy Protocol Assessment PRN for score 0-3 or on second treatment, BID, and PRN for scores above 3.    No Indications - adjust the frequency to every 6 hours PRN wheezing or bronchospasm, if no treatments needed after 48 hours then discontinue using Per Protocol order mode.     If indication present, adjust the RT bronchodilator orders based on the Bronchodilator Assessment Score as indicated below.  Use Inhaler orders unless patient has one or more of the following: on home nebulizer, not able to hold breath for 10 seconds, is not alert and oriented, cannot activate and use MDI correctly, or respiratory rate 25 breaths per minute or more, then use the equivalent nebulizer order(s) with same Frequency and PRN reasons based on the score.  If a patient is on this medication at home then do not decrease Frequency below that used at home.    0-3 - enter or revise RT bronchodilator order(s) to equivalent RT Bronchodilator order with Frequency of every 4 hours PRN  for wheezing or increased work of breathing using Per Protocol order mode.        4-6 - enter or revise RT Bronchodilator order(s) to two equivalent RT bronchodilator orders with one order with BID Frequency and one order with Frequency of every 4 hours PRN wheezing or increased work of breathing using Per Protocol order mode.        7-10 - enter or revise RT Bronchodilator order(s) to two equivalent RT bronchodilator orders with one order with TID Frequency and one order with Frequency of every 4 hours PRN wheezing or increased work of breathing using Per Protocol order mode.       11-13 - enter or revise RT Bronchodilator order(s) to one equivalent RT bronchodilator order with QID Frequency and an Albuterol order with Frequency of every 4 hours PRN wheezing or increased work of breathing using Per Protocol order mode.      Greater than 13 - enter or revise RT Bronchodilator order(s) to one equivalent RT bronchodilator order with every 4 hours Frequency and an Albuterol order with Frequency of every 2 hours PRN wheezing or increased work of breathing using Per Protocol order mode.         Electronically signed by Gem Garcia RCP on 2/9/2025 at 9:14 PM

## 2025-02-10 NOTE — PROGRESS NOTES
Bedside report and transfer of care given to Cheryl Del Rosario. Pt currently resting in bed with the call light within reach. Pt denies any other care needs at this time. Pt stable at this time.

## 2025-02-11 LAB
HIV-1 RNA BY PCR, QN: NOT DETECTED
SOURCE: NORMAL

## 2025-02-13 NOTE — PROGRESS NOTES
Physician Progress Note      PATIENT:               DEBRA MCKEON  CSN #:                  [id number]  :                       1945  ADMIT DATE:       2025 6:52 PM  DISCH DATE:        2/10/2025 3:50 PM  RESPONDING  PROVIDER #:        Ravinder Cordova MD          QUERY TEXT:    Patient noted to have HIV. CD4 on this admission was 149. There is no   documentation in the record of an HIV-related illness. Patient is on their   home regimen of antivirals. After further review, can you please clarify the   patient's HIV status as    The medical record reflects the following:    Risk Factors: HIV    Clinical Indicators: HIV-1 RNA by PCR not detected, CD4 149 CD8 540 ID note   : \"Long standing HIV infection.  Diagnosed .  No history of treatment   failure.  On stable ARV - Truvada and dolutegravir. CD4 10/2024 was 600s.\".    Treatment: Serial labs, Truvada and dolutegravir, ID consultation  Options provided:  -- Asymptomatic HIV  -- Symptomatic HIV Infection or disease  -- HIV disease/AIDs with PMH of HIV related illness or opportunistic infection  -- Other - I will add my own diagnosis  -- Disagree - Not applicable / Not valid  -- Disagree - Clinically unable to determine / Unknown  -- Refer to Clinical Documentation Reviewer    PROVIDER RESPONSE TEXT:    This patient has asymptomatic HIV.    Query created by: Martha Ochoa on 2025 9:09 AM      Electronically signed by:  Ravinder Cordova MD 2025 1:43 PM

## 2025-08-29 ENCOUNTER — APPOINTMENT (OUTPATIENT)
Dept: GENERAL RADIOLOGY | Age: 80
End: 2025-08-29
Payer: MEDICARE

## 2025-08-29 ENCOUNTER — APPOINTMENT (OUTPATIENT)
Dept: CT IMAGING | Age: 80
End: 2025-08-29
Payer: MEDICARE

## 2025-08-29 ENCOUNTER — HOSPITAL ENCOUNTER (OUTPATIENT)
Age: 80
Setting detail: OBSERVATION
Discharge: NURSING, REHAB OR LONG-TERM CARE | End: 2025-08-31
Attending: EMERGENCY MEDICINE | Admitting: INTERNAL MEDICINE
Payer: MEDICARE

## 2025-08-29 DIAGNOSIS — M48.00 CENTRAL STENOSIS OF SPINAL CANAL: ICD-10-CM

## 2025-08-29 DIAGNOSIS — R47.1 DYSARTHRIA: ICD-10-CM

## 2025-08-29 DIAGNOSIS — R41.82 ALTERED MENTAL STATUS, UNSPECIFIED ALTERED MENTAL STATUS TYPE: Primary | ICD-10-CM
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
## 2025-08-29 DIAGNOSIS — I63.9 CEREBROVASCULAR ACCIDENT (CVA), UNSPECIFIED MECHANISM (HCC): ICD-10-CM

## 2025-08-29 PROBLEM — R29.90 STROKE-LIKE SYMPTOMS: Status: ACTIVE | Noted: 2025-08-29

## 2025-08-29 LAB
ALBUMIN SERPL-MCNC: 3.6 G/DL (ref 3.4–5)
ALBUMIN/GLOB SERPL: 1.6 {RATIO} (ref 1.1–2.2)
ALP SERPL-CCNC: 57 U/L (ref 40–129)
ALT SERPL-CCNC: 10 U/L (ref 10–40)
ANION GAP SERPL CALCULATED.3IONS-SCNC: 10 MMOL/L (ref 3–16)
AST SERPL-CCNC: 14 U/L (ref 15–37)
BASE EXCESS BLDV CALC-SCNC: -2.7 MMOL/L (ref -3–3)
BASOPHILS # BLD: 0 K/UL (ref 0–0.2)
BASOPHILS NFR BLD: 0.4 %
BILIRUB SERPL-MCNC: 0.3 MG/DL (ref 0–1)
BILIRUB UR QL STRIP.AUTO: NEGATIVE
BUN SERPL-MCNC: 15 MG/DL (ref 7–20)
CALCIUM SERPL-MCNC: 8.8 MG/DL (ref 8.3–10.6)
CHLORIDE SERPL-SCNC: 99 MMOL/L (ref 99–110)
CHP ED QC CHECK: 139
CLARITY UR: CLEAR
CO2 BLDV-SCNC: 23 MMOL/L
CO2 SERPL-SCNC: 26 MMOL/L (ref 21–32)
COHGB MFR BLDV: 3.3 % (ref 0–1.5)
COLOR UR: ABNORMAL
CREAT SERPL-MCNC: 1 MG/DL (ref 0.8–1.3)
DEPRECATED RDW RBC AUTO: 13.7 % (ref 12.4–15.4)
EKG ATRIAL RATE: 100 BPM
EKG DIAGNOSIS: NORMAL
EKG P AXIS: 60 DEGREES
EKG P-R INTERVAL: 156 MS
EKG Q-T INTERVAL: 360 MS
EKG QRS DURATION: 78 MS
EKG QTC CALCULATION (BAZETT): 464 MS
EKG R AXIS: -23 DEGREES
EKG T AXIS: 79 DEGREES
EKG VENTRICULAR RATE: 100 BPM
EOSINOPHIL # BLD: 0.1 K/UL (ref 0–0.6)
EOSINOPHIL NFR BLD: 1.6 %
EPI CELLS #/AREA URNS HPF: ABNORMAL /HPF (ref 0–5)
FLUAV RNA RESP QL NAA+PROBE: NOT DETECTED
FLUBV RNA RESP QL NAA+PROBE: NOT DETECTED
GFR SERPLBLD CREATININE-BSD FMLA CKD-EPI: 77 ML/MIN/{1.73_M2}
GLUCOSE BLD-MCNC: 139 MG/DL (ref 70–99)
GLUCOSE SERPL-MCNC: 111 MG/DL (ref 70–99)
GLUCOSE UR STRIP.AUTO-MCNC: NEGATIVE MG/DL
HCO3 BLDV-SCNC: 21.9 MMOL/L (ref 23–29)
HCT VFR BLD AUTO: 41 % (ref 40.5–52.5)
HGB BLD-MCNC: 14.1 G/DL (ref 13.5–17.5)
HGB UR QL STRIP.AUTO: ABNORMAL
INR PPP: 1.1 (ref 0.86–1.14)
KETONES UR STRIP.AUTO-MCNC: NEGATIVE MG/DL
LACTATE BLDV-SCNC: 1.7 MMOL/L (ref 0.4–1.9)
LEUKOCYTE ESTERASE UR QL STRIP.AUTO: NEGATIVE
LYMPHOCYTES # BLD: 2.2 K/UL (ref 1–5.1)
LYMPHOCYTES NFR BLD: 35.8 %
MCH RBC QN AUTO: 35.1 PG (ref 26–34)
MCHC RBC AUTO-ENTMCNC: 34.4 G/DL (ref 31–36)
MCV RBC AUTO: 101.8 FL (ref 80–100)
METHGB MFR BLDV: 0.3 %
MONOCYTES # BLD: 0.5 K/UL (ref 0–1.3)
MONOCYTES NFR BLD: 7.6 %
NEUTROPHILS # BLD: 3.4 K/UL (ref 1.7–7.7)
NEUTROPHILS NFR BLD: 54.6 %
NITRITE UR QL STRIP.AUTO: NEGATIVE
O2 CT VFR BLDV CALC: 20 VOL %
O2 THERAPY: ABNORMAL
PCO2 BLDV: 37.9 MMHG (ref 40–50)
PERFORMED ON: ABNORMAL
PH BLDV: 7.38 [PH] (ref 7.35–7.45)
PH UR STRIP.AUTO: 6.5 [PH] (ref 5–8)
PLATELET # BLD AUTO: 201 K/UL (ref 135–450)
PMV BLD AUTO: 7.7 FL (ref 5–10.5)
PO2 BLDV: 58.9 MMHG (ref 25–40)
POTASSIUM SERPL-SCNC: 4 MMOL/L (ref 3.5–5.1)
PROT SERPL-MCNC: 5.9 G/DL (ref 6.4–8.2)
PROT UR STRIP.AUTO-MCNC: NEGATIVE MG/DL
PROTHROMBIN TIME: 14.5 SEC (ref 12.1–14.9)
RBC # BLD AUTO: 4.02 M/UL (ref 4.2–5.9)
RBC #/AREA URNS HPF: ABNORMAL /HPF (ref 0–4)
SAO2 % BLDV: 90 %
SARS-COV-2 RNA RESP QL NAA+PROBE: NOT DETECTED
SODIUM SERPL-SCNC: 135 MMOL/L (ref 136–145)
SP GR UR STRIP.AUTO: 1.01 (ref 1–1.03)
TROPONIN, HIGH SENSITIVITY: 12 NG/L (ref 0–22)
UA COMPLETE W REFLEX CULTURE PNL UR: ABNORMAL
UA DIPSTICK W REFLEX MICRO PNL UR: YES
URN SPEC COLLECT METH UR: ABNORMAL
UROBILINOGEN UR STRIP-ACNC: 0.2 E.U./DL
WBC # BLD AUTO: 6.1 K/UL (ref 4–11)
WBC #/AREA URNS HPF: ABNORMAL /HPF (ref 0–5)

## 2025-08-29 PROCEDURE — 2580000003 HC RX 258: Performed by: EMERGENCY MEDICINE

## 2025-08-29 PROCEDURE — 85025 COMPLETE CBC W/AUTO DIFF WBC: CPT

## 2025-08-29 PROCEDURE — 93010 ELECTROCARDIOGRAM REPORT: CPT | Performed by: INTERNAL MEDICINE

## 2025-08-29 PROCEDURE — 70496 CT ANGIOGRAPHY HEAD: CPT

## 2025-08-29 PROCEDURE — 83605 ASSAY OF LACTIC ACID: CPT

## 2025-08-29 PROCEDURE — 84484 ASSAY OF TROPONIN QUANT: CPT

## 2025-08-29 PROCEDURE — 85610 PROTHROMBIN TIME: CPT

## 2025-08-29 PROCEDURE — 99285 EMERGENCY DEPT VISIT HI MDM: CPT

## 2025-08-29 PROCEDURE — 2500000003 HC RX 250 WO HCPCS: Performed by: NURSE PRACTITIONER

## 2025-08-29 PROCEDURE — 6370000000 HC RX 637 (ALT 250 FOR IP): Performed by: NURSE PRACTITIONER

## 2025-08-29 PROCEDURE — G0378 HOSPITAL OBSERVATION PER HR: HCPCS

## 2025-08-29 PROCEDURE — 82803 BLOOD GASES ANY COMBINATION: CPT

## 2025-08-29 PROCEDURE — 70450 CT HEAD/BRAIN W/O DYE: CPT

## 2025-08-29 PROCEDURE — 6360000004 HC RX CONTRAST MEDICATION: Performed by: EMERGENCY MEDICINE

## 2025-08-29 PROCEDURE — 93005 ELECTROCARDIOGRAM TRACING: CPT | Performed by: EMERGENCY MEDICINE

## 2025-08-29 PROCEDURE — 87636 SARSCOV2 & INF A&B AMP PRB: CPT

## 2025-08-29 PROCEDURE — 36415 COLL VENOUS BLD VENIPUNCTURE: CPT

## 2025-08-29 PROCEDURE — 71046 X-RAY EXAM CHEST 2 VIEWS: CPT

## 2025-08-29 PROCEDURE — 81001 URINALYSIS AUTO W/SCOPE: CPT

## 2025-08-29 PROCEDURE — 51701 INSERT BLADDER CATHETER: CPT

## 2025-08-29 PROCEDURE — 80053 COMPREHEN METABOLIC PANEL: CPT

## 2025-08-29 PROCEDURE — 72170 X-RAY EXAM OF PELVIS: CPT

## 2025-08-29 PROCEDURE — 96361 HYDRATE IV INFUSION ADD-ON: CPT

## 2025-08-29 PROCEDURE — 96360 HYDRATION IV INFUSION INIT: CPT

## 2025-08-29 RX ORDER — SODIUM CHLORIDE 9 MG/ML
INJECTION, SOLUTION INTRAVENOUS PRN
Status: DISCONTINUED | OUTPATIENT
Start: 2025-08-29 | End: 2025-08-31 | Stop reason: HOSPADM

## 2025-08-29 RX ORDER — ASPIRIN 300 MG/1
300 SUPPOSITORY RECTAL DAILY
Status: DISCONTINUED | OUTPATIENT
Start: 2025-08-29 | End: 2025-08-31 | Stop reason: HOSPADM

## 2025-08-29 RX ORDER — POLYETHYLENE GLYCOL 3350 17 G/17G
17 POWDER, FOR SOLUTION ORAL DAILY PRN
Status: DISCONTINUED | OUTPATIENT
Start: 2025-08-29 | End: 2025-08-31 | Stop reason: HOSPADM

## 2025-08-29 RX ORDER — ATORVASTATIN CALCIUM 40 MG/1
80 TABLET, FILM COATED ORAL NIGHTLY
Status: DISCONTINUED | OUTPATIENT
Start: 2025-08-29 | End: 2025-08-31

## 2025-08-29 RX ORDER — SODIUM CHLORIDE 0.9 % (FLUSH) 0.9 %
5-40 SYRINGE (ML) INJECTION EVERY 12 HOURS SCHEDULED
Status: DISCONTINUED | OUTPATIENT
Start: 2025-08-29 | End: 2025-08-31 | Stop reason: HOSPADM

## 2025-08-29 RX ORDER — ONDANSETRON 2 MG/ML
4 INJECTION INTRAMUSCULAR; INTRAVENOUS EVERY 6 HOURS PRN
Status: DISCONTINUED | OUTPATIENT
Start: 2025-08-29 | End: 2025-08-31 | Stop reason: HOSPADM

## 2025-08-29 RX ORDER — ASPIRIN 81 MG/1
81 TABLET, CHEWABLE ORAL DAILY
Status: DISCONTINUED | OUTPATIENT
Start: 2025-08-29 | End: 2025-08-31 | Stop reason: HOSPADM

## 2025-08-29 RX ORDER — IOPAMIDOL 755 MG/ML
75 INJECTION, SOLUTION INTRAVASCULAR
Status: COMPLETED | OUTPATIENT
Start: 2025-08-29 | End: 2025-08-29

## 2025-08-29 RX ORDER — FERROUS SULFATE 325(65) MG
325 TABLET ORAL
COMMUNITY

## 2025-08-29 RX ORDER — 0.9 % SODIUM CHLORIDE 0.9 %
1000 INTRAVENOUS SOLUTION INTRAVENOUS ONCE
Status: COMPLETED | OUTPATIENT
Start: 2025-08-29 | End: 2025-08-29

## 2025-08-29 RX ORDER — SODIUM CHLORIDE 0.9 % (FLUSH) 0.9 %
5-40 SYRINGE (ML) INJECTION PRN
Status: DISCONTINUED | OUTPATIENT
Start: 2025-08-29 | End: 2025-08-31 | Stop reason: HOSPADM

## 2025-08-29 RX ORDER — ONDANSETRON 4 MG/1
4 TABLET, ORALLY DISINTEGRATING ORAL EVERY 8 HOURS PRN
Status: DISCONTINUED | OUTPATIENT
Start: 2025-08-29 | End: 2025-08-31 | Stop reason: HOSPADM

## 2025-08-29 RX ADMIN — ASPIRIN 81 MG: 81 TABLET, CHEWABLE ORAL at 18:11

## 2025-08-29 RX ADMIN — SODIUM CHLORIDE 1000 ML: 9 INJECTION, SOLUTION INTRAVENOUS at 13:54

## 2025-08-29 RX ADMIN — IOPAMIDOL 75 ML: 755 INJECTION, SOLUTION INTRAVENOUS at 12:41

## 2025-08-29 RX ADMIN — Medication 10 ML: at 21:59

## 2025-08-29 RX ADMIN — ATORVASTATIN CALCIUM 80 MG: 40 TABLET, FILM COATED ORAL at 22:00

## 2025-08-29 ASSESSMENT — PAIN SCALES - GENERAL: PAINLEVEL_OUTOF10: 0

## 2025-08-30 ENCOUNTER — APPOINTMENT (OUTPATIENT)
Dept: MRI IMAGING | Age: 80
End: 2025-08-30
Payer: MEDICARE

## 2025-08-30 ENCOUNTER — APPOINTMENT (OUTPATIENT)
Age: 80
End: 2025-08-30
Payer: MEDICARE

## 2025-08-30 PROBLEM — M48.02 CERVICAL STENOSIS OF SPINAL CANAL: Status: ACTIVE | Noted: 2025-08-30
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
## 2025-08-30 PROBLEM — G45.9 TIA (TRANSIENT ISCHEMIC ATTACK): Status: ACTIVE | Noted: 2025-08-30

## 2025-08-30 LAB
ANION GAP SERPL CALCULATED.3IONS-SCNC: 12 MMOL/L (ref 3–16)
BASOPHILS # BLD: 0 K/UL (ref 0–0.2)
BASOPHILS NFR BLD: 0.5 %
BUN SERPL-MCNC: 13 MG/DL (ref 7–20)
CALCIUM SERPL-MCNC: 9.4 MG/DL (ref 8.3–10.6)
CHLORIDE SERPL-SCNC: 105 MMOL/L (ref 99–110)
CHOLEST SERPL-MCNC: 141 MG/DL (ref 0–199)
CO2 SERPL-SCNC: 25 MMOL/L (ref 21–32)
CREAT SERPL-MCNC: 1 MG/DL (ref 0.8–1.3)
DEPRECATED RDW RBC AUTO: 13.4 % (ref 12.4–15.4)
ECHO BSA: 1.9 M2
ECHO LV EF PHYSICIAN: 33 %
EOSINOPHIL # BLD: 0.2 K/UL (ref 0–0.6)
EOSINOPHIL NFR BLD: 2.5 %
GFR SERPLBLD CREATININE-BSD FMLA CKD-EPI: 77 ML/MIN/{1.73_M2}
GLUCOSE SERPL-MCNC: 98 MG/DL (ref 70–99)
HCT VFR BLD AUTO: 40.8 % (ref 40.5–52.5)
HDLC SERPL-MCNC: 25 MG/DL (ref 40–60)
HGB BLD-MCNC: 14.5 G/DL (ref 13.5–17.5)
LDLC SERPL CALC-MCNC: 93 MG/DL
LYMPHOCYTES # BLD: 2.2 K/UL (ref 1–5.1)
LYMPHOCYTES NFR BLD: 31.3 %
MCH RBC QN AUTO: 35.7 PG (ref 26–34)
MCHC RBC AUTO-ENTMCNC: 35.5 G/DL (ref 31–36)
MCV RBC AUTO: 100.5 FL (ref 80–100)
MONOCYTES # BLD: 0.7 K/UL (ref 0–1.3)
MONOCYTES NFR BLD: 9.6 %
NEUTROPHILS # BLD: 4 K/UL (ref 1.7–7.7)
NEUTROPHILS NFR BLD: 56.1 %
PLATELET # BLD AUTO: 221 K/UL (ref 135–450)
PMV BLD AUTO: 7.9 FL (ref 5–10.5)
POTASSIUM SERPL-SCNC: 3.8 MMOL/L (ref 3.5–5.1)
RBC # BLD AUTO: 4.06 M/UL (ref 4.2–5.9)
SODIUM SERPL-SCNC: 142 MMOL/L (ref 136–145)
TRIGL SERPL-MCNC: 114 MG/DL (ref 0–150)
VLDLC SERPL CALC-MCNC: 23 MG/DL
WBC # BLD AUTO: 7.2 K/UL (ref 4–11)

## 2025-08-30 PROCEDURE — 93325 DOPPLER ECHO COLOR FLOW MAPG: CPT | Performed by: INTERNAL MEDICINE

## 2025-08-30 PROCEDURE — G0378 HOSPITAL OBSERVATION PER HR: HCPCS

## 2025-08-30 PROCEDURE — 70551 MRI BRAIN STEM W/O DYE: CPT

## 2025-08-30 PROCEDURE — 6360000004 HC RX CONTRAST MEDICATION: Performed by: NURSE PRACTITIONER

## 2025-08-30 PROCEDURE — 2500000003 HC RX 250 WO HCPCS: Performed by: NURSE PRACTITIONER

## 2025-08-30 PROCEDURE — 99221 1ST HOSP IP/OBS SF/LOW 40: CPT | Performed by: NEUROMUSCULOSKELETAL MEDICINE & OMM

## 2025-08-30 PROCEDURE — 97166 OT EVAL MOD COMPLEX 45 MIN: CPT

## 2025-08-30 PROCEDURE — C8924 2D TTE W OR W/O FOL W/CON,FU: HCPCS

## 2025-08-30 PROCEDURE — 36415 COLL VENOUS BLD VENIPUNCTURE: CPT

## 2025-08-30 PROCEDURE — 72141 MRI NECK SPINE W/O DYE: CPT

## 2025-08-30 PROCEDURE — 97162 PT EVAL MOD COMPLEX 30 MIN: CPT

## 2025-08-30 PROCEDURE — 83036 HEMOGLOBIN GLYCOSYLATED A1C: CPT

## 2025-08-30 PROCEDURE — 80061 LIPID PANEL: CPT

## 2025-08-30 PROCEDURE — 80048 BASIC METABOLIC PNL TOTAL CA: CPT

## 2025-08-30 PROCEDURE — 97530 THERAPEUTIC ACTIVITIES: CPT

## 2025-08-30 PROCEDURE — 85025 COMPLETE CBC W/AUTO DIFF WBC: CPT

## 2025-08-30 PROCEDURE — 6370000000 HC RX 637 (ALT 250 FOR IP): Performed by: NURSE PRACTITIONER

## 2025-08-30 PROCEDURE — 93308 TTE F-UP OR LMTD: CPT | Performed by: INTERNAL MEDICINE

## 2025-08-30 RX ADMIN — Medication 10 ML: at 09:47

## 2025-08-30 RX ADMIN — ASPIRIN 81 MG: 81 TABLET, CHEWABLE ORAL at 09:47

## 2025-08-30 RX ADMIN — ATORVASTATIN CALCIUM 80 MG: 40 TABLET, FILM COATED ORAL at 20:26

## 2025-08-30 RX ADMIN — Medication 10 ML: at 20:26

## 2025-08-30 RX ADMIN — SULFUR HEXAFLUORIDE 3 ML: KIT at 08:39

## 2025-08-31 VITALS
SYSTOLIC BLOOD PRESSURE: 130 MMHG | WEIGHT: 157 LBS | HEART RATE: 94 BPM | BODY MASS INDEX: 21.26 KG/M2 | DIASTOLIC BLOOD PRESSURE: 82 MMHG | RESPIRATION RATE: 18 BRPM | HEIGHT: 72 IN | TEMPERATURE: 97.5 F | OXYGEN SATURATION: 98 %

## 2025-08-31 LAB
EST. AVERAGE GLUCOSE BLD GHB EST-MCNC: 85.3 MG/DL
HBA1C MFR BLD: 4.6 %

## 2025-08-31 PROCEDURE — G0378 HOSPITAL OBSERVATION PER HR: HCPCS

## 2025-08-31 PROCEDURE — 92610 EVALUATE SWALLOWING FUNCTION: CPT

## 2025-08-31 PROCEDURE — 99232 SBSQ HOSP IP/OBS MODERATE 35: CPT | Performed by: NEUROMUSCULOSKELETAL MEDICINE & OMM

## 2025-08-31 PROCEDURE — 6370000000 HC RX 637 (ALT 250 FOR IP): Performed by: NURSE PRACTITIONER

## 2025-08-31 PROCEDURE — 2500000003 HC RX 250 WO HCPCS: Performed by: NURSE PRACTITIONER

## 2025-08-31 RX ORDER — FUROSEMIDE 40 MG/1
20 TABLET ORAL DAILY
DISCHARGE
Start: 2025-08-31

## 2025-08-31 RX ORDER — ALLOPURINOL 100 MG/1
100 TABLET ORAL 2 TIMES DAILY
DISCHARGE
Start: 2025-08-31

## 2025-08-31 RX ORDER — ATORVASTATIN CALCIUM 40 MG/1
40 TABLET, FILM COATED ORAL NIGHTLY
Status: DISCONTINUED | OUTPATIENT
Start: 2025-08-31 | End: 2025-08-31 | Stop reason: HOSPADM

## 2025-08-31 RX ADMIN — ASPIRIN 81 MG: 81 TABLET, CHEWABLE ORAL at 10:03

## 2025-08-31 RX ADMIN — Medication 10 ML: at 10:04

## (undated) DEVICE — Z DISCONTINUED USE 2131664 WIPE INSTR W3XL3IN NONLINTING

## (undated) DEVICE — SOLUTION IV IRRIG 250ML ST LF 0.9% SODIUM 2F7122

## (undated) DEVICE — SUTURE CHROMIC GUT 5/0 18 HE-3 D/A G1792K

## (undated) DEVICE — GAUZE SPONGES,12 PLY: Brand: CURITY

## (undated) DEVICE — ENT I-LF: Brand: MEDLINE INDUSTRIES, INC.

## (undated) DEVICE — 6 ML SYRINGE LUER-LOCK TIP: Brand: MONOJECT

## (undated) DEVICE — MARKER STERILE TWIN TIP

## (undated) DEVICE — COVER LT HNDL BLU PLAS

## (undated) DEVICE — SUTURE DEKNATEL POLYDEK 4-0 L18IN NONABSORBABLE GRN ME-2 6692

## (undated) DEVICE — GLOVE SURG SZ 65 L12IN FNGR THK87MIL WHT LTX FREE

## (undated) DEVICE — SUTURE MILD CHROM GUT 6-0 L18IN ABSRB C-1 L12MM 3/8 CIR REV G3810

## (undated) DEVICE — SOLUTION IRRIG 250ML STRL H2O PLAS POUR BTL USP